# Patient Record
Sex: FEMALE | Race: WHITE | NOT HISPANIC OR LATINO | Employment: FULL TIME | URBAN - METROPOLITAN AREA
[De-identification: names, ages, dates, MRNs, and addresses within clinical notes are randomized per-mention and may not be internally consistent; named-entity substitution may affect disease eponyms.]

---

## 2017-04-13 ENCOUNTER — HOSPITAL ENCOUNTER (EMERGENCY)
Facility: MEDICAL CENTER | Age: 32
End: 2017-04-13
Attending: EMERGENCY MEDICINE
Payer: MEDICAID

## 2017-04-13 VITALS
HEIGHT: 66 IN | OXYGEN SATURATION: 97 % | HEART RATE: 74 BPM | RESPIRATION RATE: 20 BRPM | BODY MASS INDEX: 47.09 KG/M2 | DIASTOLIC BLOOD PRESSURE: 69 MMHG | TEMPERATURE: 97.8 F | WEIGHT: 293 LBS | SYSTOLIC BLOOD PRESSURE: 112 MMHG

## 2017-04-13 DIAGNOSIS — R11.2 NON-INTRACTABLE VOMITING WITH NAUSEA, UNSPECIFIED VOMITING TYPE: ICD-10-CM

## 2017-04-13 DIAGNOSIS — N83.209 HEMORRHAGIC OVARIAN CYST: ICD-10-CM

## 2017-04-13 PROCEDURE — 36415 COLL VENOUS BLD VENIPUNCTURE: CPT

## 2017-04-13 PROCEDURE — 96374 THER/PROPH/DIAG INJ IV PUSH: CPT

## 2017-04-13 PROCEDURE — 700111 HCHG RX REV CODE 636 W/ 250 OVERRIDE (IP): Performed by: EMERGENCY MEDICINE

## 2017-04-13 PROCEDURE — 99284 EMERGENCY DEPT VISIT MOD MDM: CPT

## 2017-04-13 RX ORDER — METOCLOPRAMIDE HYDROCHLORIDE 5 MG/ML
5 INJECTION INTRAMUSCULAR; INTRAVENOUS ONCE
Status: COMPLETED | OUTPATIENT
Start: 2017-04-13 | End: 2017-04-13

## 2017-04-13 RX ORDER — METOCLOPRAMIDE 10 MG/1
10 TABLET ORAL 4 TIMES DAILY PRN
Qty: 30 TAB | Refills: 0 | Status: SHIPPED | OUTPATIENT
Start: 2017-04-13 | End: 2021-09-08

## 2017-04-13 RX ORDER — OXYCODONE HYDROCHLORIDE AND ACETAMINOPHEN 5; 325 MG/1; MG/1
1-2 TABLET ORAL EVERY 4 HOURS PRN
Qty: 20 TAB | Refills: 0 | Status: SHIPPED | OUTPATIENT
Start: 2017-04-13 | End: 2021-09-08

## 2017-04-13 RX ADMIN — METOCLOPRAMIDE 5 MG: 5 INJECTION, SOLUTION INTRAMUSCULAR; INTRAVENOUS at 18:23

## 2017-04-13 ASSESSMENT — PAIN SCALES - GENERAL: PAINLEVEL_OUTOF10: 8

## 2017-04-13 NOTE — ED AVS SNAPSHOT
Home Care Instructions                                                                                                                Jackie Jose   MRN: [id number]    Department:  [facility], Emergency Dept   Date of Visit:  4/13/2017            [facility], Emergency Dept    [address] [address]    Phone:  [phone]      You were seen by     Ilsa Morillo M.D.      Your Diagnosis Was     Hemorrhagic ovarian cyst     N83.209       These are the medications you received during your hospitalization from 04/13/2017 1649 to 04/13/2017 1937     Date/Time Order Dose Route Action    04/13/2017 1823 metoclopramide (REGLAN) injection 5 mg 5 mg Intravenous Given      Follow-up Information     1. Follow up with Donny Murillo M.D.. Call in 1 day.    Specialty:  OB/Gyn    Why:  for close follow-up in the next week    Contact information    [address]  [facility] [address]  [phone]        Medication Information     Review all of your home medications and newly ordered medications with your primary doctor and/or pharmacist as soon as possible. Follow medication instructions as directed by your doctor and/or pharmacist.     Please keep your complete medication list with you and share with your physician. Update the information when medications are discontinued, doses are changed, or new medications (including over-the-counter products) are added; and carry medication information at all times in the event of emergency situations.               Medication List      ASK your doctor about these medications        Instructions    Morning Afternoon Evening Bedtime    dicyclomine 20 MG Tabs   Commonly known as:  BENTYL        Take 1 Tab by mouth 4 times a day as needed (abdominal pain).   Dose:  20 mg                        loperamide 2 MG Caps   Commonly known as:  IMODIUM        Take 1 Cap by mouth 4 times a day as needed for Diarrhea.   Dose:  2 mg                           * metoclopramide 10 MG Tabs   What changed:  Another medication with the same name was added. Make sure you understand how and when to take each.   Commonly known as:  REGLAN   Ask about: Which instructions should I use?        Take 1 Tab by mouth 4 times a day as needed (prn nausea).   Dose:  10 mg                        * metoclopramide 10 MG Tabs   What changed:  You were already taking a medication with the same name, and this prescription was added. Make sure you understand how and when to take each.   Commonly known as:  REGLAN   Ask about: Which instructions should I use?        Take 1 Tab by mouth 4 times a day as needed (nausea).   Dose:  10 mg                        ondansetron 4 MG Tbdp   Commonly known as:  ZOFRAN ODT        Take 1 Tab by mouth every 8 hours as needed for Nausea/Vomiting.   Dose:  4 mg                        * oxycodone-acetaminophen 5-325 MG Tabs   What changed:  Another medication with the same name was added. Make sure you understand how and when to take each.   Commonly known as:  PERCOCET   Ask about: Which instructions should I use?        Take 1-2 Tabs by mouth every 6 hours as needed (pain).   Dose:  1-2 Tab                        * oxycodone-acetaminophen 5-325 MG Tabs   What changed:  Another medication with the same name was added. Make sure you understand how and when to take each.   Commonly known as:  PERCOCET   Ask about: Which instructions should I use?        Take 1-2 Tabs by mouth every four hours as needed (pain).   Dose:  1-2 Tab                        * oxycodone-acetaminophen 5-325 MG Tabs   What changed:  You were already taking a medication with the same name, and this prescription was added. Make sure you understand how and when to take each.   Commonly known as:  PERCOCET   Ask about: Which instructions should I use?        Take 1-2 Tabs by mouth every four hours as needed.   Dose:  1-2 Tab                        * Notice:  This list has 5  medication(s) that are the same as other medications prescribed for you. Read the directions carefully, and ask your doctor or other care provider to review them with you.         Where to Get Your Medications      These medications were sent to [facility] - [place], [place] - [address] [place] [address]     Phone:  [phone]    - metoclopramide 10 MG Tabs      You can get these medications from any pharmacy     Bring a paper prescription for each of these medications    - oxycodone-acetaminophen 5-325 MG Tabs              Discharge Instructions       Ovarian Cyst  An ovarian cyst is a sac filled with fluid or blood. This sac is attached to the ovary. Some cysts go away on their own. Other cysts need treatment.   HOME CARE   · Only take medicine as told by your doctor.  · Follow up with your doctor as told.  · Get regular pelvic exams and Pap tests.  GET HELP IF:  · Your periods are late, not regular, or painful.  · You stop having periods.  · Your belly (abdominal) or pelvic pain does not go away.  · Your belly becomes large or puffy (swollen).  · You have a hard time peeing (totally emptying your bladder).  · You have pressure on your bladder.  · You have pain during sex.  · You feel fullness, pressure, or discomfort in your belly.  · You lose weight for no reason.  · You feel sick most of the time.  · You have a hard time pooping (constipation).  · You do not feel like eating.  · You develop pimples (acne).  · You have an increase in hair on your body and face.  · You are gaining weight for no reason.  · You think you are pregnant.  GET HELP RIGHT AWAY IF:   · Your belly pain gets worse.  · You feel sick to your stomach (nauseous), and you throw up (vomit).  · You have a fever that comes on fast.  · You have belly pain while pooping (bowel movement).  · Your periods are heavier than usual.  MAKE SURE YOU:   · Understand these instructions.  · Will watch your condition.  · Will get help  right away if you are not doing well or get worse.     This information is not intended to replace advice given to you by your health care provider. Make sure you discuss any questions you have with your health care provider.     Document Released: 06/05/2009 Document Revised: 10/08/2014 Document Reviewed: 08/25/2014  Elsevier Interactive Patient Education ©2016 [employer]            Patient Information     Patient Information    Following emergency treatment: all patient requiring follow-up care must return either to a private physician or a clinic if your condition worsens before you are able to obtain further medical attention, please return to the emergency room.     Billing Information    At [facility], we work to make the billing process streamlined for our patients.  Our Representatives are here to answer any questions you may have regarding your hospital bill.  If you have insurance coverage and have supplied your insurance information to us, we will submit a claim to your insurer on your behalf.  Should you have any questions regarding your bill, we can be reached online or by phone as follows:  Online: You are able pay your bills online or live chat with our representatives about any billing questions you may have. We are here to help Monday - Friday from 8:00am to 7:30pm and 9:00am - 12:00pm on Saturdays.  Please visit [email or web address]  for more information.   Phone:  [phone] or [phone]    Please note that your emergency physician, surgeon, pathologist, radiologist, anesthesiologist, and other specialists are not employed by [facility] and will therefore bill separately for their services.  Please contact them directly for any questions concerning their bills at the numbers below:     Emergency Physician Services:  [phone]  [facility]:  [phone]  [facility]:  [phone]  [facility]:   [phone]    1. Your final bill may vary from the amount quoted upon discharge if all procedures are not complete at that time, or if your doctor has additional procedures of which we are not aware. You will receive an additional bill if you return to the Emergency Department at [facility] for suture removal regardless of the facility of which the sutures were placed.     2. Please arrange for settlement of this account at the emergency registration.    3. All self-pay accounts are due in full at the time of treatment.  If you are unable to meet this obligation then payment is expected within 4-5 days.     4. If you have had radiology studies (CT, X-ray, Ultrasound, MRI), you have received a preliminary result during your emergency department visit. Please contact the radiology department [phone] to receive a copy of your final result. Please discuss the Final result with your primary physician or with the follow up physician provided.     Crisis Hotline:  [place] Crisis Hotline:  4-875-DYXYPCH or 1-756.545.6128  [place] Crisis Hotline:    1-671.209.5914 or 716-592-6392         ED Discharge Follow Up Questions    1. In order to provide you with very good care, we would like to follow up with a phone call in the next few days.  May we have your permission to contact you?     YES /  NO    2. What is the best phone number to call you? (       )_____-__________    3. What is the best time to call you?      Morning  /  Afternoon  /  Evening                   Patient Signature:  ____________________________________________________________    Date:  ____________________________________________________________

## 2017-04-13 NOTE — ED NOTES
Chief Complaint   Patient presents with   • RLQ Pain     bib amb. seen at [facility] for large cyst on R. ovary. meds not helping pain.   • Nausea       Given fentanyl, 500mL of NS, and zofran by EMS

## 2017-04-13 NOTE — ED AVS SNAPSHOT
Musicnotes Access Code: [email or web address]  Expires: 5/13/2017  7:37 PM    Musicnotes  A secure, online tool to manage your health information     [employer]’s Musicnotes® is a secure, online tool that connects you to your personalized health information from the privacy of your home -- day or night - making it very easy for you to manage your healthcare. Once the activation process is completed, you can even access your medical information using the Musicnotes debbie, which is available for free in the Apple Debbie store or Google Play store.     Musicnotes provides the following levels of access (as shown below):   My Chart Features   [facility] Primary Care Doctor [facility]  Specialists [facility]  Urgent  Care Non-[facility]  Primary Care  Doctor   Email your healthcare team securely and privately 24/7 X X X X   Manage appointments: schedule your next appointment; view details of past/upcoming appointments X      Request prescription refills. X      View recent personal medical records, including lab and immunizations X X X X   View health record, including health history, allergies, medications X X X X   Read reports about your outpatient visits, procedures, consult and ER notes X X X X   See your discharge summary, which is a recap of your hospital and/or ER visit that includes your diagnosis, lab results, and care plan. X X       How to register for Musicnotes:  1. Go to  [email or web address].  2. Click on the Sign Up Now box, which takes you to the New Member Sign Up page. You will need to provide the following information:  a. Enter your Musicnotes Access Code exactly as it appears at the top of this page. (You will not need to use this code after you’ve completed the sign-up process. If you do not sign up before the expiration date, you must request a new code.)   b. Enter your date of birth.   c. Enter your home email address.   d. Click Submit, and follow the next screen’s instructions.  3. Create a Musicnotes ID. This will be your Musicnotes  login ID and cannot be changed, so think of one that is secure and easy to remember.  4. Create a Satmex password. You can change your password at any time.  5. Enter your Password Reset Question and Answer. This can be used at a later time if you forget your password.   6. Enter your e-mail address. This allows you to receive e-mail notifications when new information is available in Satmex.  7. Click Sign Up. You can now view your health information.    For assistance activating your Satmex account, call [phone]

## 2017-04-13 NOTE — ED AVS SNAPSHOT
4/13/2017    Jackie Jose  [address]  [place] [address]    Dear Jackie:    [facility] wants to ensure your discharge home is safe and you or your loved ones have had all of your questions answered regarding your care after you leave the hospital.    Below is a list of resources and contact information should you have any questions regarding your hospital stay, follow-up instructions, or active medical symptoms.    Questions or Concerns Regarding… Contact   Medical Questions Related to Your Discharge  (7 days a week, 8am-5pm) Contact a Nurse Care Coordinator   [phone]   Medical Questions Not Related to Your Discharge  (24 hours a day / 7 days a week)  Contact the Nurse Health Line   [phone]    Medications or Discharge Instructions Refer to your discharge packet   or contact your [facility] Primary Care Provider   [phone]   Follow-up Appointment(s) Schedule your appointment via [employer]   or contact Scheduling [phone]   Billing Review your statement via [employer]  or contact Billing [phone]   Medical Records Review your records via [employer]   or contact Medical Records [phone]     You may receive a telephone call within two days of discharge. This call is to make certain you understand your discharge instructions and have the opportunity to have any questions answered. You can also easily access your medical information, test results and upcoming appointments via the [employer] free online health management tool. You can learn more and sign up at [email or web address]/[employer]. For assistance setting up your [employer] account, please call [phone].    Once again, we want to ensure your discharge home is safe and that you have a clear understanding of any next steps in your care. If you have any questions or concerns, please do not hesitate to contact us, we are here for you. Thank you for choosing [facility] for your healthcare needs.    Sincerely,    Your [facility] Healthcare Team

## 2017-04-14 NOTE — ED PROVIDER NOTES
"ED Provider Note    Scribed for Dr. Ilsa Morillo M.D. by Padmini Light. 4/13/2017, 5:03 PM.    Primary care provider: [facility]  Means of arrival: Ambulance  History obtained from: Patient  History limited by: None    CHIEF COMPLAINT  Chief Complaint   Patient presents with   • RLQ Pain     bib amb. seen at [facility] for large cyst on R. ovary. meds not helping pain.   • Nausea       HPI  Jackie Jose is a 32 y.o. female who presents to the Emergency Department brought in by ambulance due to intermittent right lower quadrant pain onset Sunday and worsened today. The patient was seen at [facility] on Tuesday and was told that she had a large cyst on her right ovary. She was discharged with a prescription for Ibuprofen and Ultram and instructed to return if her symptoms worsen. The patient reports that her symptoms worsened at 2 or 3 AM today. While she was at work today, she had an intermittent \"stabbing and shooting\" pain and came to [facility] since \"it is closer than [facility]\". She has had associated symptoms of intermittent nausea and vomiting since Sunday and states that she has been unable to retain food. She has been retaining some fluids. The patient also reports chills, intermittent dizziness, frequent urination, and light vaginal bleeding for the past couple of days. The vaginal bleeding will last for a couple of hours before resolving. The patient does not report any aggravating or alleviating factors for her symptoms. Denies vaginal discharge, diarrhea, or fever. Her LMP was 3/8/17 and is normally on time. She has had an appendectomy, cholecystectomy, and 3 C-sections. Per patient, she has a referral for Dr. Murillo (Ob/GYN) but has not been able to see him as yet due to insurance reasons.     REVIEW OF SYSTEMS  Pertinent positives include right lower quadrant pain, nausea, vomiting, chills, dizziness, frequent urination, and vaginal bleeding. Pertinent negatives include no " vaginal discharge, diarrhea, or fever. As above, all other systems reviewed and are negative.   See HPI for further details.   C    PAST MEDICAL HISTORY  Past Medical History   Diagnosis Date   • H/O:   &    • Previous  delivery, antepartum condition or complication 2012   • Appendicitis      Appy    • Vomiting 9/15/2013       SURGICAL HISTORY  Past Surgical History   Procedure Laterality Date   • Other orthopedic surgery       meniscus repair   • Suzanne by laparoscopy  2013     Performed by Dm Whalen M.D. at SURGERY [facility] ORS   • Appendectomy  2012   • Gyn surgery          • Pelviscopy  2012     Performed by Woodrow Lopez M.D. at SURGERY [facility] ORS   • Primary c section     • Repeat c section w tubal ligation  2013     Performed by Tobi Harris M.D. at LABOR AND DELIVERY       SOCIAL HISTORY  Social History   Substance Use Topics   • Smoking status: Former Smoker -- 0.33 packs/day for 6 years     Types: Cigarettes     Quit date: 2014   • Smokeless tobacco: Never Used      Comment: Last smoked 10/09/12   • Alcohol Use: Yes      Comment: socially      History   Drug Use No     Comment: denies       FAMILY HISTORY  Family History   Problem Relation Age of Onset   • Hypertension Father    • Bipolar disorder Sister      Middle sibling   • Thyroid Maternal Grandmother    • Alcohol/Drug Maternal Grandfather        CURRENT MEDICATIONS  No current facility-administered medications on file prior to encounter.     Current Outpatient Prescriptions on File Prior to Encounter   Medication Sig Dispense Refill   • metoclopramide (REGLAN) 10 MG Tab Take 1 Tab by mouth 4 times a day as needed (prn nausea). 20 Tab 0   • dicyclomine (BENTYL) 20 MG Tab Take 1 Tab by mouth 4 times a day as needed (abdominal pain). 20 Tab 0   • loperamide (IMODIUM) 2 MG Cap Take 1 Cap by mouth 4 times a day as needed for Diarrhea. 30 Cap 3   • ondansetron  "(ZOFRAN ODT) 4 MG TABLET DISPERSIBLE Take 1 Tab by mouth every 8 hours as needed for Nausea/Vomiting. 5 Tab 0   • oxycodone-acetaminophen (PERCOCET) 5-325 MG Tab Take 1-2 Tabs by mouth every four hours as needed (pain). 20 Tab 0   • oxycodone-acetaminophen (PERCOCET) 5-325 MG TABS Take 1-2 Tabs by mouth every 6 hours as needed (pain). 12 Tab 0       ALLERGIES  Allergies   Allergen Reactions   • Other Drug Anaphylaxis     IV contrast for CT, pt had respiratory distress and nausea    • Morphine      Chest pain       PHYSICAL EXAM  VITAL SIGNS: /69 mmHg  Pulse 63  Temp(Src) 36.6 °C (97.8 °F)  Resp 20  Ht 1.676 m (5' 6\")  Wt 136.079 kg (300 lb)  BMI 48.44 kg/m2  LMP 07/14/2012  Vitals reviewed.    Consitutional: Well-developed, obese. Mild distress.   HENT: Normocephalic, right external ear normal, left external ear normal, oropharynx clear and moist.  Eyes: Conjunctivae normal, extraocular movements normal. Negative for: discharge in right and left eye, icterus.  Neck: Range of motion normal, supple. Negative for cervical adenopathy.  Cardiovascular: Normal rate, regular rhythm, heart sounds normal, intact distal pulses. Negative for: murmur, rub, gallop.  Pulmonary/Chest Wall: Effort normal, breath sounds normal. Negative for: respiratory distress, wheezes, rales, rhonchi.   Abdominal: Soft, bowel sounds normal. Tenderness to right lower quadrant with involuntary guarding, palpation of right upper quadrant causes lower quadrant pain. Negative for: distention.   Musculoskeletal: Normal range of motion. Negative for edema.  Neurological: Alert and oriented x3. No focal deficits.  Skin: Warm, dry. Negative for rash.  Psych: Mood/affect normal, behavior normal, judgment normal.    COURSE & MEDICAL DECISION MAKING  Nursing notes, VS, PMSFHx reviewed in chart.      5:03 PM Patient seen and examined at bedside. I explained that we will get her information from [facility]. Patient understands and agrees. " "    5:56 PM- Patient will be treated with reglan 5 mg IV for her symptoms.     7:15 PM- Reviewed labs and radiological studies from [facility]. According to their ultrasound, patient had a hemorrhagic 3 cm right ovarian cyst with normal labs including urine results.    7:17 PM - Re-examined; The patient is fast asleep. She will be discharged with a prescription for reglan and percocet. The patient was given a referral to Dr. Murillo (Ob/GYN) and should to return to the ED if her symptoms worsen. Her vitals prior to discharge are: /69 mmHg  Pulse 74  Temp(Src) 36.6 °C (97.8 °F)  Resp 20  Ht 1.676 m (5' 6\")  Wt 136.079 kg (300 lb)  BMI 48.44 kg/m2  SpO2 97%  LMP 07/14/2012    I reviewed prescription monitoring program for patient's narcotic use before prescribing a scheduled drug.The patient will not drink alcohol nor drive with prescribed medications. The patient will return for new or worsening symptoms and is stable at the time of discharge.    The patient is referred to a primary physician for blood pressure management, diabetic screening, and for all other preventive health concerns.      DISPOSITION:  Patient will be discharged home in stable condition.    FOLLOW UP:  Donny Murillo M.D.  [address] [place], [place] [address]  [facility] [address]  [phone]    Call in 1 day  for close follow-up in the next week      OUTPATIENT MEDICATIONS:  Discharge Medication List as of 4/13/2017  7:37 PM      START taking these medications    Details   !! metoclopramide (REGLAN) 10 MG Tab Take 1 Tab by mouth 4 times a day as needed (nausea)., Disp-30 Tab, R-0, Normal      !! oxycodone-acetaminophen (PERCOCET) 5-325 MG Tab Take 1-2 Tabs by mouth every four hours as needed., Disp-20 Tab, R-0, Print Rx Paper       !! - Potential duplicate medications found. Please discuss with provider.            FINAL IMPRESSION  1. Hemorrhagic ovarian cyst    2. Non-intractable vomiting with nausea, unspecified vomiting type          I, Padmini VEGA. " Kuldeep (Scribe), am scribing for, and in the presence of, Ilsa Morillo M.D..    Electronically signed by: Padmini Light (Thao), 4/13/2017    IlIsa M.D. personally performed the services described in this documentation, as scribed by Padmini Light in my presence, and it is both accurate and complete.      The note accurately reflects work and decisions made by me.  Ilsa Morillo  4/13/2017  8:40 PM

## 2017-04-14 NOTE — DISCHARGE INSTRUCTIONS
Ovarian Cyst  An ovarian cyst is a sac filled with fluid or blood. This sac is attached to the ovary. Some cysts go away on their own. Other cysts need treatment.   HOME CARE   · Only take medicine as told by your doctor.  · Follow up with your doctor as told.  · Get regular pelvic exams and Pap tests.  GET HELP IF:  · Your periods are late, not regular, or painful.  · You stop having periods.  · Your belly (abdominal) or pelvic pain does not go away.  · Your belly becomes large or puffy (swollen).  · You have a hard time peeing (totally emptying your bladder).  · You have pressure on your bladder.  · You have pain during sex.  · You feel fullness, pressure, or discomfort in your belly.  · You lose weight for no reason.  · You feel sick most of the time.  · You have a hard time pooping (constipation).  · You do not feel like eating.  · You develop pimples (acne).  · You have an increase in hair on your body and face.  · You are gaining weight for no reason.  · You think you are pregnant.  GET HELP RIGHT AWAY IF:   · Your belly pain gets worse.  · You feel sick to your stomach (nauseous), and you throw up (vomit).  · You have a fever that comes on fast.  · You have belly pain while pooping (bowel movement).  · Your periods are heavier than usual.  MAKE SURE YOU:   · Understand these instructions.  · Will watch your condition.  · Will get help right away if you are not doing well or get worse.     This information is not intended to replace advice given to you by your health care provider. Make sure you discuss any questions you have with your health care provider.     Document Released: 06/05/2009 Document Revised: 10/08/2014 Document Reviewed: 08/25/2014  [employer] Patient Education ©2016 [employer]

## 2017-04-26 ENCOUNTER — HOSPITAL ENCOUNTER (OUTPATIENT)
Dept: LAB | Facility: MEDICAL CENTER | Age: 32
End: 2017-04-26
Attending: SPECIALIST
Payer: MEDICAID

## 2017-04-26 PROCEDURE — 87491 CHLMYD TRACH DNA AMP PROBE: CPT

## 2017-04-26 PROCEDURE — 88305 TISSUE EXAM BY PATHOLOGIST: CPT

## 2017-04-26 PROCEDURE — 88175 CYTOPATH C/V AUTO FLUID REDO: CPT

## 2017-04-26 PROCEDURE — 87591 N.GONORRHOEAE DNA AMP PROB: CPT

## 2017-04-26 PROCEDURE — 87624 HPV HI-RISK TYP POOLED RSLT: CPT

## 2017-04-28 LAB — PATHOLOGY CONSULT NOTE: NORMAL

## 2017-04-30 LAB
C TRACH DNA GENITAL QL NAA+PROBE: NEGATIVE
CYTOLOGY REG CYTOL: NORMAL
HPV HR 12 DNA CVX QL NAA+PROBE: NEGATIVE
HPV16 DNA SPEC QL NAA+PROBE: NEGATIVE
HPV18 DNA SPEC QL NAA+PROBE: NEGATIVE
N GONORRHOEA DNA GENITAL QL NAA+PROBE: NEGATIVE
SPECIMEN SOURCE: NORMAL
SPECIMEN SOURCE: NORMAL

## 2020-02-08 ENCOUNTER — HOSPITAL ENCOUNTER (EMERGENCY)
Dept: HOSPITAL 8 - ED | Age: 35
Discharge: HOME | End: 2020-02-08
Payer: SELF-PAY

## 2020-02-08 VITALS — DIASTOLIC BLOOD PRESSURE: 74 MMHG | SYSTOLIC BLOOD PRESSURE: 103 MMHG

## 2020-02-08 VITALS — HEIGHT: 69 IN | BODY MASS INDEX: 27.76 KG/M2 | WEIGHT: 187.39 LBS

## 2020-02-08 DIAGNOSIS — Z98.51: ICD-10-CM

## 2020-02-08 DIAGNOSIS — Z90.49: ICD-10-CM

## 2020-02-08 DIAGNOSIS — F17.200: ICD-10-CM

## 2020-02-08 DIAGNOSIS — R19.7: ICD-10-CM

## 2020-02-08 DIAGNOSIS — R10.84: Primary | ICD-10-CM

## 2020-02-08 LAB
ALBUMIN SERPL-MCNC: 3.8 G/DL (ref 3.4–5)
ALP SERPL-CCNC: 79 U/L (ref 45–117)
ALT SERPL-CCNC: 10 U/L (ref 12–78)
ANION GAP SERPL CALC-SCNC: 10 MMOL/L (ref 5–15)
BASOPHILS # BLD AUTO: 0.01 X10^3/UL (ref 0–0.1)
BASOPHILS NFR BLD AUTO: 0 % (ref 0–1)
BILIRUB SERPL-MCNC: 0.4 MG/DL (ref 0.2–1)
CALCIUM SERPL-MCNC: 8.6 MG/DL (ref 8.5–10.1)
CHLORIDE SERPL-SCNC: 105 MMOL/L (ref 98–107)
CREAT SERPL-MCNC: 0.9 MG/DL (ref 0.55–1.02)
EOSINOPHIL # BLD AUTO: 0.01 X10^3/UL (ref 0–0.4)
EOSINOPHIL NFR BLD AUTO: 0 % (ref 1–7)
ERYTHROCYTE [DISTWIDTH] IN BLOOD BY AUTOMATED COUNT: 18.3 % (ref 9.6–15.2)
LYMPHOCYTES # BLD AUTO: 0.88 X10^3/UL (ref 1–3.4)
LYMPHOCYTES NFR BLD AUTO: 15 % (ref 22–44)
MCH RBC QN AUTO: 26.5 PG (ref 27–34.8)
MCHC RBC AUTO-ENTMCNC: 31.7 G/DL (ref 32.4–35.8)
MCV RBC AUTO: 83.5 FL (ref 80–100)
MD: NO
MONOCYTES # BLD AUTO: 0.41 X10^3/UL (ref 0.2–0.8)
MONOCYTES NFR BLD AUTO: 7 % (ref 2–9)
NEUTROPHILS # BLD AUTO: 4.49 X10^3/UL (ref 1.8–6.8)
NEUTROPHILS NFR BLD AUTO: 77 % (ref 42–75)
PLATELET # BLD AUTO: 181 X10^3/UL (ref 130–400)
PMV BLD AUTO: 8.3 FL (ref 7.4–10.4)
PROT SERPL-MCNC: 8.1 G/DL (ref 6.4–8.2)
RBC # BLD AUTO: 4.92 X10^6/UL (ref 3.82–5.3)

## 2020-02-08 PROCEDURE — 84703 CHORIONIC GONADOTROPIN ASSAY: CPT

## 2020-02-08 PROCEDURE — 83690 ASSAY OF LIPASE: CPT

## 2020-02-08 PROCEDURE — 80053 COMPREHEN METABOLIC PANEL: CPT

## 2020-02-08 PROCEDURE — 85025 COMPLETE CBC W/AUTO DIFF WBC: CPT

## 2020-02-08 PROCEDURE — 99283 EMERGENCY DEPT VISIT LOW MDM: CPT

## 2020-02-08 PROCEDURE — 96372 THER/PROPH/DIAG INJ SC/IM: CPT

## 2020-02-08 PROCEDURE — 36415 COLL VENOUS BLD VENIPUNCTURE: CPT

## 2020-02-08 PROCEDURE — 96374 THER/PROPH/DIAG INJ IV PUSH: CPT

## 2020-02-08 PROCEDURE — 96361 HYDRATE IV INFUSION ADD-ON: CPT

## 2020-02-08 NOTE — NUR
THIS IS A 33 YO F BIB [facility] W/ C/O EPIGASTRIC/PERIUMBILICAL ABD PAIN, NAUSEA, 
CHILLS, BODYACHES AND DIARRHEA X5 DAYS. NADN. PT IS TACHYCARDIC OTHER VS WITHIN 
NORMAL LIMITS. RESTING ON GURNEY W/ CALL LIGHT IN REACH.

## 2020-02-08 NOTE — NUR
PIV STARTED. PT MEDICATED PER EMAR. LABS COLLECTED. VS STABLE. CALL LIGHT IN 
REACH. AWAITING TEST RESULTS AT THIS TIME.

## 2020-02-08 NOTE — NUR
PT RESTING ON [facility] W/ CALL LIGHT IN REACH. DC INSTRUCTIONS UP. PER  
WILL WAIT FOR 1L NS TO FINISH INFUSING BEFORE DC.

## 2020-08-04 ENCOUNTER — HOSPITAL ENCOUNTER (EMERGENCY)
Dept: HOSPITAL 8 - ED | Age: 35
Discharge: LEFT BEFORE DISCHARGE | End: 2020-08-04
Payer: SELF-PAY

## 2020-08-04 DIAGNOSIS — Z53.21: ICD-10-CM

## 2020-08-04 DIAGNOSIS — K08.89: Primary | ICD-10-CM

## 2020-12-30 ENCOUNTER — HOSPITAL ENCOUNTER (EMERGENCY)
Facility: MEDICAL CENTER | Age: 35
End: 2020-12-30
Payer: MEDICAID

## 2020-12-30 VITALS
HEART RATE: 98 BPM | TEMPERATURE: 98.6 F | BODY MASS INDEX: 44.3 KG/M2 | OXYGEN SATURATION: 98 % | DIASTOLIC BLOOD PRESSURE: 69 MMHG | RESPIRATION RATE: 20 BRPM | HEIGHT: 69 IN | SYSTOLIC BLOOD PRESSURE: 113 MMHG

## 2020-12-30 PROCEDURE — 302449 STATCHG TRIAGE ONLY (STATISTIC)

## 2021-09-08 ENCOUNTER — HOSPITAL ENCOUNTER (EMERGENCY)
Facility: MEDICAL CENTER | Age: 36
End: 2021-09-08
Payer: MEDICAID

## 2021-09-08 VITALS
WEIGHT: 206.79 LBS | BODY MASS INDEX: 30.63 KG/M2 | HEIGHT: 69 IN | DIASTOLIC BLOOD PRESSURE: 65 MMHG | SYSTOLIC BLOOD PRESSURE: 104 MMHG | OXYGEN SATURATION: 99 % | RESPIRATION RATE: 16 BRPM | TEMPERATURE: 97.8 F | HEART RATE: 90 BPM

## 2021-09-08 LAB
APPEARANCE UR: ABNORMAL
BACTERIA #/AREA URNS HPF: ABNORMAL /HPF
BILIRUB UR QL STRIP.AUTO: NEGATIVE
COLOR UR: YELLOW
EPI CELLS #/AREA URNS HPF: ABNORMAL /HPF
GLUCOSE UR STRIP.AUTO-MCNC: NEGATIVE MG/DL
HYALINE CASTS #/AREA URNS LPF: ABNORMAL /LPF
KETONES UR STRIP.AUTO-MCNC: ABNORMAL MG/DL
LEUKOCYTE ESTERASE UR QL STRIP.AUTO: ABNORMAL
MICRO URNS: ABNORMAL
NITRITE UR QL STRIP.AUTO: POSITIVE
PH UR STRIP.AUTO: 5.5 [PH] (ref 5–8)
PROT UR QL STRIP: NEGATIVE MG/DL
RBC # URNS HPF: ABNORMAL /HPF
RBC UR QL AUTO: ABNORMAL
SP GR UR STRIP.AUTO: 1.03
UROBILINOGEN UR STRIP.AUTO-MCNC: 1 MG/DL
WBC #/AREA URNS HPF: ABNORMAL /HPF

## 2021-09-08 PROCEDURE — 302449 STATCHG TRIAGE ONLY (STATISTIC)

## 2021-09-08 PROCEDURE — 81001 URINALYSIS AUTO W/SCOPE: CPT

## 2021-09-08 NOTE — ED TRIAGE NOTES
Vitals:    09/08/21 1327   BP: 104/65   Pulse: 90   Resp: 16   Temp: 36.6 °C (97.8 °F)   SpO2: 99%     Chief Complaint   Patient presents with   • Diarrhea     for a week   • Fatigue   • Abdominal Pain     Pt has been having the above complaints for about a week. She states she hasn't tried any over the counter medications yet.     Abdominal pain protocol ordered.     Pt placed in lobby and educated on waiting room process. Apologized for wait time.

## 2023-09-19 ENCOUNTER — TELEPHONE (OUTPATIENT)
Dept: OBGYN | Facility: CLINIC | Age: 38
End: 2023-09-19
Payer: MEDICAID

## 2023-09-19 ENCOUNTER — TELEPHONE (OUTPATIENT)
Dept: OBGYN | Facility: CLINIC | Age: 38
End: 2023-09-19

## 2023-10-27 ENCOUNTER — HOSPITAL ENCOUNTER (EMERGENCY)
Facility: MEDICAL CENTER | Age: 38
End: 2023-10-27
Attending: EMERGENCY MEDICINE
Payer: MEDICAID

## 2023-10-27 VITALS
TEMPERATURE: 98.3 F | BODY MASS INDEX: 37.03 KG/M2 | DIASTOLIC BLOOD PRESSURE: 68 MMHG | SYSTOLIC BLOOD PRESSURE: 116 MMHG | HEIGHT: 69 IN | RESPIRATION RATE: 16 BRPM | OXYGEN SATURATION: 99 % | HEART RATE: 62 BPM | WEIGHT: 250 LBS

## 2023-10-27 DIAGNOSIS — L03.115 CELLULITIS OF RIGHT LOWER EXTREMITY: ICD-10-CM

## 2023-10-27 PROCEDURE — 700111 HCHG RX REV CODE 636 W/ 250 OVERRIDE (IP): Mod: UD | Performed by: EMERGENCY MEDICINE

## 2023-10-27 PROCEDURE — A9270 NON-COVERED ITEM OR SERVICE: HCPCS | Mod: UD | Performed by: EMERGENCY MEDICINE

## 2023-10-27 PROCEDURE — 700102 HCHG RX REV CODE 250 W/ 637 OVERRIDE(OP): Mod: UD | Performed by: EMERGENCY MEDICINE

## 2023-10-27 PROCEDURE — 99283 EMERGENCY DEPT VISIT LOW MDM: CPT

## 2023-10-27 RX ORDER — CEPHALEXIN 500 MG/1
500 CAPSULE ORAL 4 TIMES DAILY
Qty: 28 CAPSULE | Refills: 0 | Status: ACTIVE | OUTPATIENT
Start: 2023-10-27 | End: 2023-11-03

## 2023-10-27 RX ORDER — OXYCODONE HYDROCHLORIDE AND ACETAMINOPHEN 5; 325 MG/1; MG/1
1 TABLET ORAL ONCE
Status: COMPLETED | OUTPATIENT
Start: 2023-10-27 | End: 2023-10-27

## 2023-10-27 RX ADMIN — OXYCODONE AND ACETAMINOPHEN 1 TABLET: 5; 325 TABLET ORAL at 08:27

## 2023-10-27 ASSESSMENT — FIBROSIS 4 INDEX: FIB4 SCORE: 0.82

## 2023-10-27 NOTE — ED NOTES
BEDSIDE REPORT RECEIVED FROM GEORGE MÁRQUEZ     PT IS RESTING IN BED. BREATHING IS EVEN AND UNLABORED, SKIN IS WARM AND DRY, VSS, NAD, WILL CONTINUE TO MONITOR. PENDING MD EVALUATION.

## 2023-10-27 NOTE — DISCHARGE INSTRUCTIONS
See your doctor for recheck in 48 hours or return to the ER for worsening symptoms, fever, chills, increasing pain, or other concerns    Take Keflex as prescribed for skin infection    Apply warm compresses

## 2023-10-27 NOTE — ED NOTES
RN visualized wound. Pt  [place] around outer edge of swelling before pt left for work last night. + swelling/redness outside of marked area. Attempted PIV access, pt states he has hx of IV drug use. RN unsuccessful at IV placement at this time.
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
## 2023-10-27 NOTE — ED NOTES
Bedside report to GEORGE Mcdonough. Pt remains on room air, connected to vitals. Pending ERP to bedside. Care relinquished.

## 2023-10-27 NOTE — ED TRIAGE NOTES
"Chief Complaint   Patient presents with    Abscess     Pt reports right posterior thigh; possible abscess. Pt reports redness/warm to the touch around reported site. Pt informed RN,  squeezed the possible abscess,worsen since. Hx MRSA. Rate pain 6/10 aching/itching.        39 y/o female ambulatory to triage for above complaint. Aaox4.    Pt place in waiting area. Educated on triage process. Pt will inform staff of any medical changes.    /79   Pulse 81   Temp 37.1 °C (98.7 °F) (Temporal)   Resp 18   Ht 1.753 m (5' 9\")   Wt 113 kg (250 lb)   LMP 10/25/2023 (Exact Date)   SpO2 99%   BMI 36.92 kg/m²     "

## 2023-12-01 ENCOUNTER — HOSPITAL ENCOUNTER (EMERGENCY)
Facility: MEDICAL CENTER | Age: 38
End: 2023-12-01
Attending: EMERGENCY MEDICINE
Payer: MEDICAID

## 2023-12-01 VITALS
WEIGHT: 273.15 LBS | TEMPERATURE: 97.4 F | SYSTOLIC BLOOD PRESSURE: 137 MMHG | DIASTOLIC BLOOD PRESSURE: 85 MMHG | HEIGHT: 69 IN | BODY MASS INDEX: 40.46 KG/M2 | OXYGEN SATURATION: 97 % | RESPIRATION RATE: 18 BRPM | HEART RATE: 86 BPM

## 2023-12-01 DIAGNOSIS — R10.30 LOWER ABDOMINAL PAIN: ICD-10-CM

## 2023-12-01 LAB
ALBUMIN SERPL BCP-MCNC: 4 G/DL (ref 3.2–4.9)
ALBUMIN/GLOB SERPL: 1.3 G/DL
ALP SERPL-CCNC: 72 U/L (ref 30–99)
ALT SERPL-CCNC: 10 U/L (ref 2–50)
ANION GAP SERPL CALC-SCNC: 12 MMOL/L (ref 7–16)
APPEARANCE UR: CLEAR
AST SERPL-CCNC: 14 U/L (ref 12–45)
BASOPHILS # BLD AUTO: 0.5 % (ref 0–1.8)
BASOPHILS # BLD: 0.03 K/UL (ref 0–0.12)
BILIRUB SERPL-MCNC: 0.4 MG/DL (ref 0.1–1.5)
BILIRUB UR QL STRIP.AUTO: NEGATIVE
BUN SERPL-MCNC: 16 MG/DL (ref 8–22)
CALCIUM ALBUM COR SERPL-MCNC: 8.7 MG/DL (ref 8.5–10.5)
CALCIUM SERPL-MCNC: 8.7 MG/DL (ref 8.5–10.5)
CHLORIDE SERPL-SCNC: 104 MMOL/L (ref 96–112)
CO2 SERPL-SCNC: 20 MMOL/L (ref 20–33)
COLOR UR: YELLOW
CREAT SERPL-MCNC: 0.58 MG/DL (ref 0.5–1.4)
EOSINOPHIL # BLD AUTO: 0.17 K/UL (ref 0–0.51)
EOSINOPHIL NFR BLD: 2.9 % (ref 0–6.9)
ERYTHROCYTE [DISTWIDTH] IN BLOOD BY AUTOMATED COUNT: 38.8 FL (ref 35.9–50)
GFR SERPLBLD CREATININE-BSD FMLA CKD-EPI: 120 ML/MIN/1.73 M 2
GLOBULIN SER CALC-MCNC: 3 G/DL (ref 1.9–3.5)
GLUCOSE SERPL-MCNC: 88 MG/DL (ref 65–99)
GLUCOSE UR STRIP.AUTO-MCNC: NEGATIVE MG/DL
HCG SERPL QL: NEGATIVE
HCT VFR BLD AUTO: 37.9 % (ref 37–47)
HGB BLD-MCNC: 13 G/DL (ref 12–16)
IMM GRANULOCYTES # BLD AUTO: 0.01 K/UL (ref 0–0.11)
IMM GRANULOCYTES NFR BLD AUTO: 0.2 % (ref 0–0.9)
KETONES UR STRIP.AUTO-MCNC: ABNORMAL MG/DL
LEUKOCYTE ESTERASE UR QL STRIP.AUTO: NEGATIVE
LIPASE SERPL-CCNC: 21 U/L (ref 11–82)
LYMPHOCYTES # BLD AUTO: 1.32 K/UL (ref 1–4.8)
LYMPHOCYTES NFR BLD: 22.3 % (ref 22–41)
MCH RBC QN AUTO: 30 PG (ref 27–33)
MCHC RBC AUTO-ENTMCNC: 34.3 G/DL (ref 32.2–35.5)
MCV RBC AUTO: 87.5 FL (ref 81.4–97.8)
MICRO URNS: ABNORMAL
MONOCYTES # BLD AUTO: 0.35 K/UL (ref 0–0.85)
MONOCYTES NFR BLD AUTO: 5.9 % (ref 0–13.4)
NEUTROPHILS # BLD AUTO: 4.03 K/UL (ref 1.82–7.42)
NEUTROPHILS NFR BLD: 68.2 % (ref 44–72)
NITRITE UR QL STRIP.AUTO: NEGATIVE
NRBC # BLD AUTO: 0 K/UL
NRBC BLD-RTO: 0 /100 WBC (ref 0–0.2)
PH UR STRIP.AUTO: 5.5 [PH] (ref 5–8)
PLATELET # BLD AUTO: 194 K/UL (ref 164–446)
PMV BLD AUTO: 9.7 FL (ref 9–12.9)
POTASSIUM SERPL-SCNC: 3.9 MMOL/L (ref 3.6–5.5)
PROT SERPL-MCNC: 7 G/DL (ref 6–8.2)
PROT UR QL STRIP: NEGATIVE MG/DL
RBC # BLD AUTO: 4.33 M/UL (ref 4.2–5.4)
RBC UR QL AUTO: NEGATIVE
SODIUM SERPL-SCNC: 136 MMOL/L (ref 135–145)
SP GR UR STRIP.AUTO: 1.02
UROBILINOGEN UR STRIP.AUTO-MCNC: 0.2 MG/DL
WBC # BLD AUTO: 5.9 K/UL (ref 4.8–10.8)

## 2023-12-01 PROCEDURE — 99284 EMERGENCY DEPT VISIT MOD MDM: CPT

## 2023-12-01 PROCEDURE — 84703 CHORIONIC GONADOTROPIN ASSAY: CPT

## 2023-12-01 PROCEDURE — 80053 COMPREHEN METABOLIC PANEL: CPT

## 2023-12-01 PROCEDURE — 81003 URINALYSIS AUTO W/O SCOPE: CPT

## 2023-12-01 PROCEDURE — 302449 STATCHG TRIAGE ONLY (STATISTIC)

## 2023-12-01 PROCEDURE — 85025 COMPLETE CBC W/AUTO DIFF WBC: CPT

## 2023-12-01 PROCEDURE — 83690 ASSAY OF LIPASE: CPT

## 2023-12-01 PROCEDURE — 36415 COLL VENOUS BLD VENIPUNCTURE: CPT

## 2023-12-01 RX ORDER — ONDANSETRON 2 MG/ML
4 INJECTION INTRAMUSCULAR; INTRAVENOUS ONCE
Status: DISCONTINUED | OUTPATIENT
Start: 2023-12-01 | End: 2023-12-01

## 2023-12-01 RX ORDER — ONDANSETRON 4 MG/1
4 TABLET, ORALLY DISINTEGRATING ORAL ONCE
Status: DISCONTINUED | OUTPATIENT
Start: 2023-12-01 | End: 2023-12-01 | Stop reason: HOSPADM

## 2023-12-01 ASSESSMENT — PAIN DESCRIPTION - DESCRIPTORS: DESCRIPTORS: THROBBING

## 2023-12-01 ASSESSMENT — PAIN DESCRIPTION - PAIN TYPE
TYPE: ACUTE PAIN
TYPE: ACUTE PAIN

## 2023-12-01 ASSESSMENT — FIBROSIS 4 INDEX: FIB4 SCORE: 0.82

## 2023-12-01 NOTE — ED NOTES
Assist RN: Patient has chosen to leave the hospital against medical advice. The attending physician has not discharged the patient. Patient is not a risk to himself or others. I have discussed with the patient the following: Physician has not determined patient is ready for discharge. Risks and consequences of leaving the hospital too soon and the benefit of continued hospitalization.     Discharge against medical advice has been signed.    Attending physician has been notified.      Pt is A/O x 4, ambulatory to the lobby.

## 2023-12-01 NOTE — ED NOTES
Roque ambulated with stable gait to room, gowned, and placed on monitors (BP, Pulse Ox). Patient is low fall risk. Standard fall risk precautions in place including bed in lowest position, side rails up, call light within reach, and area free of clutter. Patient stable in RA. Chart up for ERP.

## 2023-12-01 NOTE — ED PROVIDER NOTES
"ED Provider Note    CHIEF COMPLAINT  Chief Complaint   Patient presents with    Abdominal Pain     Brought in by gt c/o lower abdominal pain off and on x months. + nausea and dizziness.        EXTERNAL RECORDS REVIEWED  External ED Note 23 at [facility]     HPI/ROS  LIMITATION TO HISTORY   Select: : None    OUTSIDE HISTORIAN(S):  None    Jackie Jose is a 38 y.o. female with endometriosis who presents for lower abdominal pain.  She reports pain in the bilateral lower quadrants intermittently for many months.  She has been told she has endometriosis and states she was seen at [facility] 3 months ago for similar pain and referred to a urogynecologist [facility].  She states \"I am on the list.\"  Patient states the pain usually improves with ibuprofen but it did not respond today so she came to the ER.  She reports a \"pressure\" sensation with associated nausea.  She denies fever, chills, vaginal discharge, urinary symptoms, vomiting, diarrhea.  Patient states she has tramadol at home and asks if she can take this.    LMP last week    Patient has had an appendectomy and tubal ligation.    Not on OCPs    PAST MEDICAL HISTORY   has a past medical history of Appendicitis (), H/O:  ( & ), Previous  delivery, antepartum condition or complication (2012), and Vomiting (9/15/2013).    SURGICAL HISTORY   has a past surgical history that includes other orthopedic surgery (); travon by laparoscopy (2013); appendectomy (2012); gyn surgery; pelviscopy (2012); primary c section; and repeat c section w tubal ligation (2013).    FAMILY HISTORY  Family History   Problem Relation Age of Onset    Hypertension Father     Bipolar disorder Sister         Middle sibling    Thyroid Maternal Grandmother     Alcohol/Drug Maternal Grandfather        SOCIAL HISTORY  Social History     Tobacco Use    Smoking status: Former     Current packs/day: 0.00     Average " "packs/day: 0.5 packs/day for 6.0 years (3.0 ttl pk-yrs)     Types: Cigarettes     Start date: 2008     Quit date: 2014     Years since quittin.8    Smokeless tobacco: Never   Vaping Use    Vaping Use: Every day    Substances: Nicotine   Substance and Sexual Activity    Alcohol use: Yes     Comment: socially    Drug use: No     Comment: denies    Sexual activity: Yes     Partners: Male     Comment: None       CURRENT MEDICATIONS  Home Medications       Reviewed by Dm Chiang R.N. (Registered Nurse) on 23 at 0430  Med List Status: Partial     Medication Last Dose Status        Patient Miguel Taking any Medications                           ALLERGIES  Allergies   Allergen Reactions    Other Drug Anaphylaxis     IV contrast for CT, pt had respiratory distress and nausea     Morphine      Chest pain       PHYSICAL EXAM  VITAL SIGNS: /85   Pulse 86   Temp 36.3 °C (97.4 °F) (Temporal)   Resp 18   Ht 1.753 m (5' 9\")   Wt 124 kg (273 lb 2.4 oz)   LMP 2023   SpO2 97%   BMI 40.34 kg/m²    General:  WDWN female, nontoxic appearing in NAD; A+Ox3; V/S as above; afebrile  Skin: warm and dry; good color; no rash  HEENT: NCAT; EOMs intact; PERRL; no scleral icterus   Neck: FROM  Cardiovascular: Regular heart rate and rhythm.  No murmurs, rubs, or gallops; pulses 2+ bilaterally radially  Lungs: No respiratory distress or tachypnea; Clear to auscultation with good air movement bilaterally.  No wheezes, rhonchi, or rales.   Abdomen: BS present; soft; NTND; no rebound, guarding, or rigidity.  No organomegaly or pulsatile mass; no mottling or CVA tenderness  Extremities: ELI x 4; no e/o trauma; no pedal edema; neg Tiffany's  Neurologic: CNs III-XII grossly intact; speech clear; distal sensation intact; strength 5/5 UE/LEs  Psychiatric: Appropriate affect, normal mood      DIAGNOSTIC STUDIES / PROCEDURES  LABS  Results for orders placed or performed during the hospital encounter of 23   CBC " WITH DIFFERENTIAL   Result Value Ref Range    WBC 5.9 4.8 - 10.8 K/uL    RBC 4.33 4.20 - 5.40 M/uL    Hemoglobin 13.0 12.0 - 16.0 g/dL    Hematocrit 37.9 37.0 - 47.0 %    MCV 87.5 81.4 - 97.8 fL    MCH 30.0 27.0 - 33.0 pg    MCHC 34.3 32.2 - 35.5 g/dL    RDW 38.8 35.9 - 50.0 fL    Platelet Count 194 164 - 446 K/uL    MPV 9.7 9.0 - 12.9 fL    Neutrophils-Polys 68.20 44.00 - 72.00 %    Lymphocytes 22.30 22.00 - 41.00 %    Monocytes 5.90 0.00 - 13.40 %    Eosinophils 2.90 0.00 - 6.90 %    Basophils 0.50 0.00 - 1.80 %    Immature Granulocytes 0.20 0.00 - 0.90 %    Nucleated RBC 0.00 0.00 - 0.20 /100 WBC    Neutrophils (Absolute) 4.03 1.82 - 7.42 K/uL    Lymphs (Absolute) 1.32 1.00 - 4.80 K/uL    Monos (Absolute) 0.35 0.00 - 0.85 K/uL    Eos (Absolute) 0.17 0.00 - 0.51 K/uL    Baso (Absolute) 0.03 0.00 - 0.12 K/uL    Immature Granulocytes (abs) 0.01 0.00 - 0.11 K/uL    NRBC (Absolute) 0.00 K/uL   COMP METABOLIC PANEL   Result Value Ref Range    Sodium 136 135 - 145 mmol/L    Potassium 3.9 3.6 - 5.5 mmol/L    Chloride 104 96 - 112 mmol/L    Co2 20 20 - 33 mmol/L    Anion Gap 12.0 7.0 - 16.0    Glucose 88 65 - 99 mg/dL    Bun 16 8 - 22 mg/dL    Creatinine 0.58 0.50 - 1.40 mg/dL    Calcium 8.7 8.5 - 10.5 mg/dL    Correct Calcium 8.7 8.5 - 10.5 mg/dL    AST(SGOT) 14 12 - 45 U/L    ALT(SGPT) 10 2 - 50 U/L    Alkaline Phosphatase 72 30 - 99 U/L    Total Bilirubin 0.4 0.1 - 1.5 mg/dL    Albumin 4.0 3.2 - 4.9 g/dL    Total Protein 7.0 6.0 - 8.2 g/dL    Globulin 3.0 1.9 - 3.5 g/dL    A-G Ratio 1.3 g/dL   LIPASE   Result Value Ref Range    Lipase 21 11 - 82 U/L   URINALYSIS,CULTURE IF INDICATED    Specimen: Urine   Result Value Ref Range    Color Yellow     Character Clear     Specific Gravity 1.025 <1.035    Ph 5.5 5.0 - 8.0    Glucose Negative Negative mg/dL    Ketones Trace (A) Negative mg/dL    Protein Negative Negative mg/dL    Bilirubin Negative Negative    Urobilinogen, Urine 0.2 Negative    Nitrite Negative Negative     Leukocyte Esterase Negative Negative    Occult Blood Negative Negative    Micro Urine Req see below    ESTIMATED GFR   Result Value Ref Range    GFR (CKD-EPI) 120 >60 mL/min/1.73 m 2         RADIOLOGY  Pelvic US: pending    COURSE & MEDICAL DECISION MAKING    ED Observation Status? No; Patient does not meet criteria for ED Observation.     INITIAL ASSESSMENT, COURSE AND PLAN  Care Narrative: This is a 38-year-old female who presented for lower abdominal pain.  Patient has a history of endometriosis and states this particular episode did not respond to the usual ibuprofen.  Patient has a soft, nontender, nonsurgical abdomen.  She has had an appendectomy.  There was difficulty establishing an IV so the patient and I decided on a plan for p.o. Zofran and Toradol IM with an ultrasound and the labs that had been drawn per abdominal pain protocol.    7:06 AM  I received a text from the patient's primary RN stating that she wished to sign out AMA.  Patient apparently left before the AMA paperwork could be completed and signed.      FINAL DIAGNOSIS  1. Lower abdominal pain    2. AMA       Electronically signed by: Alix Fontaine M.D., 12/1/2023 6:05 AM

## 2023-12-01 NOTE — ED NOTES
Jackie Jose expresses desire to leave. Risks in leaving ED before treatment given and diagnostics completed discussed with patient. EP completed AMA form and patient  signed form.

## 2023-12-01 NOTE — ED NOTES
Patient wishes to leave [facility]. She does not wish to proceed with any of the medical treatment the MD provided.

## 2023-12-01 NOTE — ED TRIAGE NOTES
Jackie Jose  38 y.o.  female  Chief Complaint   Patient presents with    Abdominal Pain     Brought in by [facility] c/o lower abdominal pain off and on x months. + nausea and dizziness.

## 2024-07-05 ENCOUNTER — HOSPITAL ENCOUNTER (EMERGENCY)
Facility: MEDICAL CENTER | Age: 39
End: 2024-07-05
Attending: EMERGENCY MEDICINE
Payer: MEDICAID

## 2024-07-05 ENCOUNTER — APPOINTMENT (OUTPATIENT)
Dept: RADIOLOGY | Facility: MEDICAL CENTER | Age: 39
End: 2024-07-05
Attending: EMERGENCY MEDICINE
Payer: MEDICAID

## 2024-07-05 VITALS
OXYGEN SATURATION: 96 % | WEIGHT: 271.83 LBS | HEART RATE: 65 BPM | BODY MASS INDEX: 40.26 KG/M2 | HEIGHT: 69 IN | TEMPERATURE: 97.5 F | DIASTOLIC BLOOD PRESSURE: 63 MMHG | RESPIRATION RATE: 18 BRPM | SYSTOLIC BLOOD PRESSURE: 106 MMHG

## 2024-07-05 DIAGNOSIS — R10.2 PELVIC PAIN: ICD-10-CM

## 2024-07-05 LAB
ALBUMIN SERPL BCP-MCNC: 4.2 G/DL (ref 3.2–4.9)
ALBUMIN/GLOB SERPL: 1.4 G/DL
ALP SERPL-CCNC: 72 U/L (ref 30–99)
ALT SERPL-CCNC: 9 U/L (ref 2–50)
ANION GAP SERPL CALC-SCNC: 12 MMOL/L (ref 7–16)
APPEARANCE UR: CLEAR
AST SERPL-CCNC: 8 U/L (ref 12–45)
BASOPHILS # BLD AUTO: 0.5 % (ref 0–1.8)
BASOPHILS # BLD: 0.03 K/UL (ref 0–0.12)
BILIRUB SERPL-MCNC: 0.4 MG/DL (ref 0.1–1.5)
BILIRUB UR QL STRIP.AUTO: NEGATIVE
BUN SERPL-MCNC: 23 MG/DL (ref 8–22)
C TRACH DNA GENITAL QL NAA+PROBE: NEGATIVE
CALCIUM ALBUM COR SERPL-MCNC: 9.2 MG/DL (ref 8.5–10.5)
CALCIUM SERPL-MCNC: 9.4 MG/DL (ref 8.5–10.5)
CANDIDA DNA VAG QL PROBE+SIG AMP: NEGATIVE
CHLORIDE SERPL-SCNC: 104 MMOL/L (ref 96–112)
CO2 SERPL-SCNC: 20 MMOL/L (ref 20–33)
COLOR UR: ABNORMAL
CREAT SERPL-MCNC: 0.66 MG/DL (ref 0.5–1.4)
EOSINOPHIL # BLD AUTO: 0.16 K/UL (ref 0–0.51)
EOSINOPHIL NFR BLD: 2.8 % (ref 0–6.9)
ERYTHROCYTE [DISTWIDTH] IN BLOOD BY AUTOMATED COUNT: 42.5 FL (ref 35.9–50)
G VAGINALIS DNA VAG QL PROBE+SIG AMP: NEGATIVE
GFR SERPLBLD CREATININE-BSD FMLA CKD-EPI: 116 ML/MIN/1.73 M 2
GLOBULIN SER CALC-MCNC: 3.1 G/DL (ref 1.9–3.5)
GLUCOSE SERPL-MCNC: 81 MG/DL (ref 65–99)
GLUCOSE UR STRIP.AUTO-MCNC: NEGATIVE MG/DL
HCG SERPL QL: NEGATIVE
HCT VFR BLD AUTO: 39.8 % (ref 37–47)
HGB BLD-MCNC: 13.4 G/DL (ref 12–16)
IMM GRANULOCYTES # BLD AUTO: 0.02 K/UL (ref 0–0.11)
IMM GRANULOCYTES NFR BLD AUTO: 0.4 % (ref 0–0.9)
KETONES UR STRIP.AUTO-MCNC: ABNORMAL MG/DL
LEUKOCYTE ESTERASE UR QL STRIP.AUTO: NEGATIVE
LIPASE SERPL-CCNC: 29 U/L (ref 11–82)
LYMPHOCYTES # BLD AUTO: 1.36 K/UL (ref 1–4.8)
LYMPHOCYTES NFR BLD: 23.9 % (ref 22–41)
MCH RBC QN AUTO: 29.8 PG (ref 27–33)
MCHC RBC AUTO-ENTMCNC: 33.7 G/DL (ref 32.2–35.5)
MCV RBC AUTO: 88.4 FL (ref 81.4–97.8)
MICRO URNS: ABNORMAL
MONOCYTES # BLD AUTO: 0.38 K/UL (ref 0–0.85)
MONOCYTES NFR BLD AUTO: 6.7 % (ref 0–13.4)
N GONORRHOEA DNA GENITAL QL NAA+PROBE: NEGATIVE
NEUTROPHILS # BLD AUTO: 3.75 K/UL (ref 1.82–7.42)
NEUTROPHILS NFR BLD: 65.7 % (ref 44–72)
NITRITE UR QL STRIP.AUTO: NEGATIVE
NRBC # BLD AUTO: 0 K/UL
NRBC BLD-RTO: 0 /100 WBC (ref 0–0.2)
PH UR STRIP.AUTO: 5.5 [PH] (ref 5–8)
PLATELET # BLD AUTO: 197 K/UL (ref 164–446)
PMV BLD AUTO: 10.5 FL (ref 9–12.9)
POTASSIUM SERPL-SCNC: 3.8 MMOL/L (ref 3.6–5.5)
PROT SERPL-MCNC: 7.3 G/DL (ref 6–8.2)
PROT UR QL STRIP: NEGATIVE MG/DL
RBC # BLD AUTO: 4.5 M/UL (ref 4.2–5.4)
RBC UR QL AUTO: NEGATIVE
SODIUM SERPL-SCNC: 136 MMOL/L (ref 135–145)
SP GR UR STRIP.AUTO: 1.04
SPECIMEN SOURCE: NORMAL
T VAGINALIS DNA VAG QL PROBE+SIG AMP: NEGATIVE
UROBILINOGEN UR STRIP.AUTO-MCNC: 1 MG/DL
WBC # BLD AUTO: 5.7 K/UL (ref 4.8–10.8)

## 2024-07-05 PROCEDURE — 99284 EMERGENCY DEPT VISIT MOD MDM: CPT

## 2024-07-05 PROCEDURE — 87660 TRICHOMONAS VAGIN DIR PROBE: CPT

## 2024-07-05 PROCEDURE — 84703 CHORIONIC GONADOTROPIN ASSAY: CPT

## 2024-07-05 PROCEDURE — 87480 CANDIDA DNA DIR PROBE: CPT

## 2024-07-05 PROCEDURE — 36415 COLL VENOUS BLD VENIPUNCTURE: CPT

## 2024-07-05 PROCEDURE — 83690 ASSAY OF LIPASE: CPT

## 2024-07-05 PROCEDURE — 700111 HCHG RX REV CODE 636 W/ 250 OVERRIDE (IP): Mod: JZ,UD | Performed by: EMERGENCY MEDICINE

## 2024-07-05 PROCEDURE — 85025 COMPLETE CBC W/AUTO DIFF WBC: CPT

## 2024-07-05 PROCEDURE — 87491 CHLMYD TRACH DNA AMP PROBE: CPT

## 2024-07-05 PROCEDURE — 96374 THER/PROPH/DIAG INJ IV PUSH: CPT

## 2024-07-05 PROCEDURE — 74176 CT ABD & PELVIS W/O CONTRAST: CPT

## 2024-07-05 PROCEDURE — 81003 URINALYSIS AUTO W/O SCOPE: CPT

## 2024-07-05 PROCEDURE — 87591 N.GONORRHOEAE DNA AMP PROB: CPT

## 2024-07-05 PROCEDURE — 80053 COMPREHEN METABOLIC PANEL: CPT

## 2024-07-05 PROCEDURE — 87510 GARDNER VAG DNA DIR PROBE: CPT

## 2024-07-05 RX ORDER — MELOXICAM 7.5 MG/1
15 TABLET ORAL DAILY
Qty: 30 TABLET | Refills: 0 | Status: SHIPPED | OUTPATIENT
Start: 2024-07-05 | End: 2024-07-20

## 2024-07-05 RX ORDER — KETOROLAC TROMETHAMINE 15 MG/ML
15 INJECTION, SOLUTION INTRAMUSCULAR; INTRAVENOUS ONCE
Status: COMPLETED | OUTPATIENT
Start: 2024-07-05 | End: 2024-07-05

## 2024-07-05 RX ADMIN — KETOROLAC TROMETHAMINE 15 MG: 15 INJECTION, SOLUTION INTRAMUSCULAR; INTRAVENOUS at 05:27

## 2024-07-05 ASSESSMENT — PAIN DESCRIPTION - PAIN TYPE
TYPE: CHRONIC PAIN
TYPE: ACUTE PAIN

## 2024-07-05 ASSESSMENT — FIBROSIS 4 INDEX: FIB4 SCORE: 1.14

## 2024-07-07 ENCOUNTER — OFFICE VISIT (OUTPATIENT)
Dept: URGENT CARE | Facility: CLINIC | Age: 39
End: 2024-07-07
Payer: MEDICAID

## 2024-07-07 VITALS
OXYGEN SATURATION: 98 % | HEIGHT: 70 IN | DIASTOLIC BLOOD PRESSURE: 76 MMHG | WEIGHT: 272.5 LBS | SYSTOLIC BLOOD PRESSURE: 130 MMHG | RESPIRATION RATE: 16 BRPM | TEMPERATURE: 96.6 F | HEART RATE: 100 BPM | BODY MASS INDEX: 39.01 KG/M2

## 2024-07-07 DIAGNOSIS — R10.2 PELVIC PAIN: ICD-10-CM

## 2024-07-07 PROCEDURE — 3078F DIAST BP <80 MM HG: CPT | Performed by: PHYSICIAN ASSISTANT

## 2024-07-07 PROCEDURE — 3075F SYST BP GE 130 - 139MM HG: CPT | Performed by: PHYSICIAN ASSISTANT

## 2024-07-07 PROCEDURE — 99214 OFFICE O/P EST MOD 30 MIN: CPT | Performed by: PHYSICIAN ASSISTANT

## 2024-07-07 RX ORDER — METHOCARBAMOL 500 MG/1
500 TABLET, FILM COATED ORAL 3 TIMES DAILY PRN
Qty: 15 TABLET | Refills: 0 | Status: SHIPPED | OUTPATIENT
Start: 2024-07-07 | End: 2024-07-12

## 2024-07-07 RX ORDER — HYDROCODONE BITARTRATE AND ACETAMINOPHEN 5; 325 MG/1; MG/1
1 TABLET ORAL EVERY 8 HOURS PRN
Qty: 15 TABLET | Refills: 0 | Status: SHIPPED | OUTPATIENT
Start: 2024-07-07 | End: 2024-07-12

## 2024-07-07 ASSESSMENT — ENCOUNTER SYMPTOMS
CONSTIPATION: 0
HEADACHES: 0
SHORTNESS OF BREATH: 0
NAUSEA: 1
ABDOMINAL PAIN: 1
FEVER: 0
COUGH: 0
SORE THROAT: 0
VOMITING: 1
MYALGIAS: 0
CHILLS: 0
DIARRHEA: 0
EYE PAIN: 0

## 2024-07-07 ASSESSMENT — FIBROSIS 4 INDEX: FIB4 SCORE: 0.5

## 2024-09-03 ENCOUNTER — OFFICE VISIT (OUTPATIENT)
Dept: URGENT CARE | Facility: CLINIC | Age: 39
End: 2024-09-03
Payer: MEDICAID

## 2024-09-03 VITALS
DIASTOLIC BLOOD PRESSURE: 64 MMHG | RESPIRATION RATE: 12 BRPM | OXYGEN SATURATION: 97 % | SYSTOLIC BLOOD PRESSURE: 118 MMHG | TEMPERATURE: 97.7 F | HEIGHT: 70 IN | HEART RATE: 80 BPM | BODY MASS INDEX: 37.65 KG/M2 | WEIGHT: 263 LBS

## 2024-09-03 DIAGNOSIS — K04.7 DENTAL INFECTION: ICD-10-CM

## 2024-09-03 PROCEDURE — 99213 OFFICE O/P EST LOW 20 MIN: CPT

## 2024-09-03 PROCEDURE — 3074F SYST BP LT 130 MM HG: CPT

## 2024-09-03 PROCEDURE — 3078F DIAST BP <80 MM HG: CPT

## 2024-09-03 RX ORDER — CHLORHEXIDINE GLUCONATE ORAL RINSE 1.2 MG/ML
15 SOLUTION DENTAL 2 TIMES DAILY
Qty: 118 ML | Refills: 1 | Status: SHIPPED | OUTPATIENT
Start: 2024-09-03

## 2024-09-03 RX ORDER — CLINDAMYCIN HCL 300 MG
300 CAPSULE ORAL 3 TIMES DAILY
Qty: 21 CAPSULE | Refills: 0 | Status: SHIPPED | OUTPATIENT
Start: 2024-09-03 | End: 2024-09-10

## 2024-09-03 ASSESSMENT — ENCOUNTER SYMPTOMS
CHILLS: 0
FEVER: 0

## 2024-09-03 ASSESSMENT — FIBROSIS 4 INDEX: FIB4 SCORE: 0.5

## 2024-09-03 NOTE — PROGRESS NOTES
CHIEF COMPLAINT  Chief Complaint   Patient presents with    Abscess     Started this morning, abscess on R side tooth, neck pain, sore throat     Subjective:   Jackie Jose is a 39 y.o. female who presents to urgent care with concern for abscess to her gum.  Patient reports symptoms started early this morning and feels similar to previous dental abscesses she has had in the past.  Patient reports symptoms of pain and swelling to her right lower gumline.  She denies any fevers or chills.  She did recently have all of her teeth extracted approximately 5 months ago.  Patient reports in the past clindamycin was the only antibiotic that seem to resolve her dental infections.        Review of Systems   Constitutional:  Negative for chills and fever.       PAST MEDICAL HISTORY  Patient Active Problem List    Diagnosis Date Noted    Medial collateral ligament sprain of knee 2014    Mesenteric lymphadenopathy 10/16/2014    Chronic abdominal pain 10/16/2014    Gastritis 10/16/2014    Intractable nausea and vomiting 10/04/2014    Splenomegaly 10/04/2014    Gastroenteritis 10/04/2014    Chest pain 2014    Vomiting 09/15/2013    Intractable pain 09/15/2013    Labor and delivery, indication for care 2013    Status post  2013    Noncompliant pregnant patient w/ labs 2013    Breech presentation 2013    Obese  BMI 38.8 2013    Heart palpitations 2013    Supervision of other high-risk pregnancy 2012    Previous  delivery, antepartum condition or complication 2012    S/P laparoscopy 2012       SURGICAL HISTORY   has a past surgical history that includes other orthopedic surgery (); travon by laparoscopy (2013); appendectomy (2012); gyn surgery; pelviscopy (2012); primary c section; and repeat c section w tubal ligation (2013).    ALLERGIES  Allergies   Allergen Reactions    Other Drug Anaphylaxis     IV contrast for CT, pt  "had respiratory distress and nausea     Morphine      Chest pain       CURRENT MEDICATIONS  Home Medications       Reviewed by Gustavo Crawford'analisa (Medical Assistant) on 09/03/24 at 0813  Med List Status: <None>     Medication Last Dose Status   diclofenac sodium (VOLTAREN) 1 % Gel PRN Active                    SOCIAL HISTORY  Social History     Tobacco Use    Smoking status: Former     Current packs/day: 0.00     Average packs/day: 0.5 packs/day for 6.0 years (3.0 ttl pk-yrs)     Types: Cigarettes     Start date: 1/16/2008     Quit date: 1/16/2014     Years since quitting: 10.6    Smokeless tobacco: Never   Vaping Use    Vaping status: Every Day    Substances: Nicotine   Substance and Sexual Activity    Alcohol use: Yes     Comment: socially    Drug use: No     Comment: denies    Sexual activity: Yes     Partners: Male     Comment: None       FAMILY HISTORY  Family History   Problem Relation Age of Onset    Hypertension Father     Bipolar disorder Sister         Middle sibling    Thyroid Maternal Grandmother     Alcohol/Drug Maternal Grandfather          Medications, Allergies, and current problem list reviewed today in Epic.     Objective:     /64   Pulse 80   Temp 36.5 °C (97.7 °F) (Temporal)   Resp 12   Ht 1.778 m (5' 10\")   Wt 119 kg (263 lb)   SpO2 97%     Physical Exam  Vitals reviewed.   Constitutional:       General: She is not in acute distress.     Appearance: Normal appearance. She is not ill-appearing or toxic-appearing.   HENT:      Head: Normocephalic.      Nose: Nose normal.      Mouth/Throat:      Mouth: Mucous membranes are moist. No oral lesions.      Dentition: Gingival swelling present.      Pharynx: Oropharynx is clear. Uvula midline. No pharyngeal swelling, oropharyngeal exudate, posterior oropharyngeal erythema or uvula swelling.      Tonsils: No tonsillar exudate or tonsillar abscesses. 0 on the right. 0 on the left.        Comments: Erythema and swelling to right lower " gumline.  Tenderness with exam.  No bleeding.  No drainage.  Cardiovascular:      Rate and Rhythm: Normal rate and regular rhythm.      Pulses: Normal pulses.      Heart sounds: Normal heart sounds.   Pulmonary:      Effort: Pulmonary effort is normal. No respiratory distress.      Breath sounds: Normal breath sounds.   Musculoskeletal:      Cervical back: Normal range of motion. No rigidity or tenderness.   Lymphadenopathy:      Cervical: No cervical adenopathy.   Skin:     General: Skin is warm.      Capillary Refill: Capillary refill takes less than 2 seconds.   Neurological:      General: No focal deficit present.      Mental Status: She is alert.   Psychiatric:         Mood and Affect: Mood normal.         Assessment/Plan:     Diagnosis and associated orders:     1. Dental infection  clindamycin (CLEOCIN) 300 MG Cap    chlorhexidine (PERIDEX) 0.12 % Solution         Comments/MDM:     Patient has stable vital signs and is non-toxic appearing. Discussed supportive care with warm salt water rinses after meals to help remove food debris as well as taking Tylenol/Ibuprofen as needed for pain. Antibiotics prescribed for dental infection.   Patient will follow up with dentist for further management.   Patient demonstrated understanding of treatment plan at this time and will RTC if symptoms worsen or fail to resolve.           Differential diagnosis, natural history, supportive care, and indications for immediate follow-up discussed.    Advised the patient to follow-up with the primary care physician for recheck, reevaluation, and consideration of further management.    Please note that this dictation was created using voice recognition software. I have made a reasonable attempt to correct obvious errors, but I expect that there are errors of grammar and possibly content that I did not discover before finalizing the note.    This note was electronically signed by NICOLAS Alvarado

## 2024-09-03 NOTE — LETTER
September 3, 2024    To Whom It May Concern:         This is confirmation that Jackie Reynoso Damon attended her scheduled appointment with NICOLAS Alvarado on 9/03/24. Please excuse from work today.          If you have any questions please do not hesitate to call me at the phone number listed below.    Sincerely,          LILO AlvaradoRLEWIS.  [phone]

## 2024-10-19 ENCOUNTER — APPOINTMENT (OUTPATIENT)
Dept: RADIOLOGY | Facility: MEDICAL CENTER | Age: 39
End: 2024-10-19
Attending: EMERGENCY MEDICINE
Payer: MEDICAID

## 2024-10-19 ENCOUNTER — HOSPITAL ENCOUNTER (EMERGENCY)
Facility: MEDICAL CENTER | Age: 39
End: 2024-10-19
Attending: EMERGENCY MEDICINE
Payer: MEDICAID

## 2024-10-19 VITALS
RESPIRATION RATE: 14 BRPM | SYSTOLIC BLOOD PRESSURE: 128 MMHG | HEIGHT: 69 IN | DIASTOLIC BLOOD PRESSURE: 68 MMHG | TEMPERATURE: 97.2 F | OXYGEN SATURATION: 99 % | HEART RATE: 75 BPM | WEIGHT: 262.57 LBS | BODY MASS INDEX: 38.89 KG/M2

## 2024-10-19 DIAGNOSIS — R20.2 PARESTHESIA: ICD-10-CM

## 2024-10-19 DIAGNOSIS — R07.9 CHEST PAIN, UNSPECIFIED TYPE: ICD-10-CM

## 2024-10-19 LAB
ALBUMIN SERPL BCP-MCNC: 3.7 G/DL (ref 3.2–4.9)
ALBUMIN/GLOB SERPL: 1.3 G/DL
ALP SERPL-CCNC: 80 U/L (ref 30–99)
ALT SERPL-CCNC: <5 U/L (ref 2–50)
ANION GAP SERPL CALC-SCNC: 11 MMOL/L (ref 7–16)
AST SERPL-CCNC: 18 U/L (ref 12–45)
BASOPHILS # BLD AUTO: 0.4 % (ref 0–1.8)
BASOPHILS # BLD: 0.03 K/UL (ref 0–0.12)
BILIRUB SERPL-MCNC: 0.4 MG/DL (ref 0.1–1.5)
BUN SERPL-MCNC: 19 MG/DL (ref 8–22)
CALCIUM ALBUM COR SERPL-MCNC: 8.9 MG/DL (ref 8.5–10.5)
CALCIUM SERPL-MCNC: 8.7 MG/DL (ref 8.5–10.5)
CHLORIDE SERPL-SCNC: 106 MMOL/L (ref 96–112)
CO2 SERPL-SCNC: 19 MMOL/L (ref 20–33)
CREAT SERPL-MCNC: 0.74 MG/DL (ref 0.5–1.4)
EKG IMPRESSION: NORMAL
EOSINOPHIL # BLD AUTO: 0.62 K/UL (ref 0–0.51)
EOSINOPHIL NFR BLD: 8.3 % (ref 0–6.9)
ERYTHROCYTE [DISTWIDTH] IN BLOOD BY AUTOMATED COUNT: 44.4 FL (ref 35.9–50)
GFR SERPLBLD CREATININE-BSD FMLA CKD-EPI: 106 ML/MIN/1.73 M 2
GLOBULIN SER CALC-MCNC: 2.9 G/DL (ref 1.9–3.5)
GLUCOSE SERPL-MCNC: 88 MG/DL (ref 65–99)
HCG SERPL QL: NEGATIVE
HCT VFR BLD AUTO: 40.9 % (ref 37–47)
HGB BLD-MCNC: 13.7 G/DL (ref 12–16)
IMM GRANULOCYTES # BLD AUTO: 0.02 K/UL (ref 0–0.11)
IMM GRANULOCYTES NFR BLD AUTO: 0.3 % (ref 0–0.9)
LYMPHOCYTES # BLD AUTO: 1.64 K/UL (ref 1–4.8)
LYMPHOCYTES NFR BLD: 21.9 % (ref 22–41)
MCH RBC QN AUTO: 30.1 PG (ref 27–33)
MCHC RBC AUTO-ENTMCNC: 33.5 G/DL (ref 32.2–35.5)
MCV RBC AUTO: 89.9 FL (ref 81.4–97.8)
MONOCYTES # BLD AUTO: 0.47 K/UL (ref 0–0.85)
MONOCYTES NFR BLD AUTO: 6.3 % (ref 0–13.4)
NEUTROPHILS # BLD AUTO: 4.72 K/UL (ref 1.82–7.42)
NEUTROPHILS NFR BLD: 62.8 % (ref 44–72)
NRBC # BLD AUTO: 0 K/UL
NRBC BLD-RTO: 0 /100 WBC (ref 0–0.2)
NT-PROBNP SERPL IA-MCNC: 82 PG/ML (ref 0–125)
PLATELET # BLD AUTO: 240 K/UL (ref 164–446)
PMV BLD AUTO: 9.7 FL (ref 9–12.9)
POTASSIUM SERPL-SCNC: 4 MMOL/L (ref 3.6–5.5)
PROT SERPL-MCNC: 6.6 G/DL (ref 6–8.2)
RBC # BLD AUTO: 4.55 M/UL (ref 4.2–5.4)
SODIUM SERPL-SCNC: 136 MMOL/L (ref 135–145)
TROPONIN T SERPL-MCNC: <6 NG/L (ref 6–19)
WBC # BLD AUTO: 7.5 K/UL (ref 4.8–10.8)

## 2024-10-19 PROCEDURE — 85025 COMPLETE CBC W/AUTO DIFF WBC: CPT

## 2024-10-19 PROCEDURE — 99284 EMERGENCY DEPT VISIT MOD MDM: CPT

## 2024-10-19 PROCEDURE — 80053 COMPREHEN METABOLIC PANEL: CPT

## 2024-10-19 PROCEDURE — 93005 ELECTROCARDIOGRAM TRACING: CPT | Performed by: EMERGENCY MEDICINE

## 2024-10-19 PROCEDURE — 84703 CHORIONIC GONADOTROPIN ASSAY: CPT

## 2024-10-19 PROCEDURE — 36415 COLL VENOUS BLD VENIPUNCTURE: CPT

## 2024-10-19 PROCEDURE — 83880 ASSAY OF NATRIURETIC PEPTIDE: CPT

## 2024-10-19 PROCEDURE — 93005 ELECTROCARDIOGRAM TRACING: CPT

## 2024-10-19 PROCEDURE — 71045 X-RAY EXAM CHEST 1 VIEW: CPT

## 2024-10-19 PROCEDURE — 84484 ASSAY OF TROPONIN QUANT: CPT

## 2024-10-19 RX ORDER — FAMOTIDINE 20 MG/1
20 TABLET, FILM COATED ORAL DAILY
Qty: 90 TABLET | Refills: 0 | Status: SHIPPED | OUTPATIENT
Start: 2024-10-19

## 2024-10-19 ASSESSMENT — FIBROSIS 4 INDEX: FIB4 SCORE: 0.5

## 2024-10-19 ASSESSMENT — PAIN DESCRIPTION - PAIN TYPE: TYPE: ACUTE PAIN

## 2024-10-29 ENCOUNTER — APPOINTMENT (OUTPATIENT)
Dept: ADMISSIONS | Facility: MEDICAL CENTER | Age: 39
End: 2024-10-29
Attending: OBSTETRICS & GYNECOLOGY
Payer: MEDICAID

## 2024-11-05 ENCOUNTER — APPOINTMENT (OUTPATIENT)
Dept: ADMISSIONS | Facility: MEDICAL CENTER | Age: 39
End: 2024-11-05
Attending: OBSTETRICS & GYNECOLOGY
Payer: MEDICAID

## 2024-11-05 NOTE — OR NURSING
Preadmit phone call completed with patient. Pt states understanding of instructions. Pt encouraged to increase oral hydration the day prior to surgery.

## 2024-11-20 ENCOUNTER — ANESTHESIA EVENT (OUTPATIENT)
Dept: SURGERY | Facility: MEDICAL CENTER | Age: 39
End: 2024-11-20
Payer: MEDICAID

## 2024-11-20 NOTE — OR NURSING
Preadmit: Call to patient to encourage increased oral fluid intake the day prior to procedure/surgery including intake of electrolyte drinks such as Gatorade or electrolyte water. Patient may have clear liquids until 2 hours prior to surgery.  Surgery date 11/21/24

## 2024-11-21 ENCOUNTER — ANESTHESIA (OUTPATIENT)
Dept: SURGERY | Facility: MEDICAL CENTER | Age: 39
End: 2024-11-21
Payer: MEDICAID

## 2024-11-21 ENCOUNTER — HOSPITAL ENCOUNTER (OUTPATIENT)
Facility: MEDICAL CENTER | Age: 39
End: 2024-11-22
Attending: SPECIALIST | Admitting: SPECIALIST
Payer: MEDICAID

## 2024-11-21 DIAGNOSIS — G89.18 POSTOPERATIVE PAIN: ICD-10-CM

## 2024-11-21 DIAGNOSIS — R14.1 ABDOMINAL GAS PAIN: ICD-10-CM

## 2024-11-21 DIAGNOSIS — K59.01 CONSTIPATION BY DELAYED COLONIC TRANSIT: ICD-10-CM

## 2024-11-21 PROBLEM — Z90.711 STATUS POST LAPAROSCOPIC SUPRACERVICAL HYSTERECTOMY: Status: ACTIVE | Noted: 2024-11-21

## 2024-11-21 LAB
HCG UR QL: NEGATIVE
PATHOLOGY CONSULT NOTE: NORMAL

## 2024-11-21 PROCEDURE — 160048 HCHG OR STATISTICAL LEVEL 1-5: Performed by: SPECIALIST

## 2024-11-21 PROCEDURE — 700102 HCHG RX REV CODE 250 W/ 637 OVERRIDE(OP): Mod: UD | Performed by: SPECIALIST

## 2024-11-21 PROCEDURE — 160036 HCHG PACU - EA ADDL 30 MINS PHASE I: Performed by: SPECIALIST

## 2024-11-21 PROCEDURE — G0378 HOSPITAL OBSERVATION PER HR: HCPCS

## 2024-11-21 PROCEDURE — 160029 HCHG SURGERY MINUTES - 1ST 30 MINS LEVEL 4: Performed by: SPECIALIST

## 2024-11-21 PROCEDURE — A9270 NON-COVERED ITEM OR SERVICE: HCPCS | Mod: UD | Performed by: SPECIALIST

## 2024-11-21 PROCEDURE — A9270 NON-COVERED ITEM OR SERVICE: HCPCS | Mod: UD | Performed by: ANESTHESIOLOGY

## 2024-11-21 PROCEDURE — 160002 HCHG RECOVERY MINUTES (STAT): Performed by: SPECIALIST

## 2024-11-21 PROCEDURE — 700101 HCHG RX REV CODE 250: Mod: UD | Performed by: SPECIALIST

## 2024-11-21 PROCEDURE — 160035 HCHG PACU - 1ST 60 MINS PHASE I: Performed by: SPECIALIST

## 2024-11-21 PROCEDURE — 160009 HCHG ANES TIME/MIN: Performed by: SPECIALIST

## 2024-11-21 PROCEDURE — 700105 HCHG RX REV CODE 258: Mod: UD | Performed by: ANESTHESIOLOGY

## 2024-11-21 PROCEDURE — 700111 HCHG RX REV CODE 636 W/ 250 OVERRIDE (IP): Mod: UD | Performed by: ANESTHESIOLOGY

## 2024-11-21 PROCEDURE — 700111 HCHG RX REV CODE 636 W/ 250 OVERRIDE (IP): Mod: JZ,UD | Performed by: ANESTHESIOLOGY

## 2024-11-21 PROCEDURE — 700102 HCHG RX REV CODE 250 W/ 637 OVERRIDE(OP): Mod: UD | Performed by: ANESTHESIOLOGY

## 2024-11-21 PROCEDURE — 88307 TISSUE EXAM BY PATHOLOGIST: CPT

## 2024-11-21 PROCEDURE — 700105 HCHG RX REV CODE 258: Mod: UD | Performed by: SPECIALIST

## 2024-11-21 PROCEDURE — 160041 HCHG SURGERY MINUTES - EA ADDL 1 MIN LEVEL 4: Performed by: SPECIALIST

## 2024-11-21 PROCEDURE — 81025 URINE PREGNANCY TEST: CPT

## 2024-11-21 PROCEDURE — 700101 HCHG RX REV CODE 250: Performed by: ANESTHESIOLOGY

## 2024-11-21 RX ORDER — HYDROMORPHONE HYDROCHLORIDE 1 MG/ML
0.2 INJECTION, SOLUTION INTRAMUSCULAR; INTRAVENOUS; SUBCUTANEOUS
Status: DISCONTINUED | OUTPATIENT
Start: 2024-11-21 | End: 2024-11-21 | Stop reason: HOSPADM

## 2024-11-21 RX ORDER — EPHEDRINE SULFATE 50 MG/ML
5 INJECTION, SOLUTION INTRAVENOUS
Status: DISCONTINUED | OUTPATIENT
Start: 2024-11-21 | End: 2024-11-21 | Stop reason: HOSPADM

## 2024-11-21 RX ORDER — SIMETHICONE 125 MG
125 TABLET,CHEWABLE ORAL 3 TIMES DAILY PRN
Status: DISCONTINUED | OUTPATIENT
Start: 2024-11-21 | End: 2024-11-22 | Stop reason: HOSPADM

## 2024-11-21 RX ORDER — LIDOCAINE HYDROCHLORIDE 20 MG/ML
INJECTION, SOLUTION EPIDURAL; INFILTRATION; INTRACAUDAL; PERINEURAL PRN
Status: DISCONTINUED | OUTPATIENT
Start: 2024-11-21 | End: 2024-11-21 | Stop reason: SURG

## 2024-11-21 RX ORDER — MEPERIDINE HYDROCHLORIDE 25 MG/ML
6.25 INJECTION INTRAMUSCULAR; INTRAVENOUS; SUBCUTANEOUS
Status: DISCONTINUED | OUTPATIENT
Start: 2024-11-21 | End: 2024-11-21 | Stop reason: HOSPADM

## 2024-11-21 RX ORDER — AMOXICILLIN 250 MG
1 CAPSULE ORAL NIGHTLY
Status: DISCONTINUED | OUTPATIENT
Start: 2024-11-21 | End: 2024-11-22 | Stop reason: HOSPADM

## 2024-11-21 RX ORDER — IBUPROFEN 800 MG/1
800 TABLET, FILM COATED ORAL 3 TIMES DAILY
Status: DISCONTINUED | OUTPATIENT
Start: 2024-11-21 | End: 2024-11-22 | Stop reason: HOSPADM

## 2024-11-21 RX ORDER — ONDANSETRON 2 MG/ML
4 INJECTION INTRAMUSCULAR; INTRAVENOUS EVERY 4 HOURS PRN
Status: DISCONTINUED | OUTPATIENT
Start: 2024-11-21 | End: 2024-11-22 | Stop reason: HOSPADM

## 2024-11-21 RX ORDER — DIPHENHYDRAMINE HYDROCHLORIDE 50 MG/ML
12.5 INJECTION INTRAMUSCULAR; INTRAVENOUS
Status: DISCONTINUED | OUTPATIENT
Start: 2024-11-21 | End: 2024-11-21 | Stop reason: HOSPADM

## 2024-11-21 RX ORDER — DEXAMETHASONE SODIUM PHOSPHATE 4 MG/ML
INJECTION, SOLUTION INTRA-ARTICULAR; INTRALESIONAL; INTRAMUSCULAR; INTRAVENOUS; SOFT TISSUE PRN
Status: DISCONTINUED | OUTPATIENT
Start: 2024-11-21 | End: 2024-11-21 | Stop reason: SURG

## 2024-11-21 RX ORDER — OXYCODONE HCL 5 MG/5 ML
5 SOLUTION, ORAL ORAL
Status: COMPLETED | OUTPATIENT
Start: 2024-11-21 | End: 2024-11-21

## 2024-11-21 RX ORDER — SCOLOPAMINE TRANSDERMAL SYSTEM 1 MG/1
1 PATCH, EXTENDED RELEASE TRANSDERMAL
Status: DISCONTINUED | OUTPATIENT
Start: 2024-11-21 | End: 2024-11-22 | Stop reason: HOSPADM

## 2024-11-21 RX ORDER — LABETALOL HYDROCHLORIDE 5 MG/ML
5 INJECTION, SOLUTION INTRAVENOUS
Status: DISCONTINUED | OUTPATIENT
Start: 2024-11-21 | End: 2024-11-21 | Stop reason: HOSPADM

## 2024-11-21 RX ORDER — CEFAZOLIN SODIUM 1 G/3ML
INJECTION, POWDER, FOR SOLUTION INTRAMUSCULAR; INTRAVENOUS PRN
Status: DISCONTINUED | OUTPATIENT
Start: 2024-11-21 | End: 2024-11-21 | Stop reason: SURG

## 2024-11-21 RX ORDER — OXYCODONE HYDROCHLORIDE 10 MG/1
10 TABLET ORAL
Status: DISCONTINUED | OUTPATIENT
Start: 2024-11-21 | End: 2024-11-22 | Stop reason: HOSPADM

## 2024-11-21 RX ORDER — BISACODYL 10 MG
10 SUPPOSITORY, RECTAL RECTAL
Status: DISCONTINUED | OUTPATIENT
Start: 2024-11-21 | End: 2024-11-22 | Stop reason: HOSPADM

## 2024-11-21 RX ORDER — ACETAMINOPHEN 500 MG
1000 TABLET ORAL ONCE
Status: COMPLETED | OUTPATIENT
Start: 2024-11-21 | End: 2024-11-21

## 2024-11-21 RX ORDER — HYDRALAZINE HYDROCHLORIDE 20 MG/ML
5 INJECTION INTRAMUSCULAR; INTRAVENOUS
Status: DISCONTINUED | OUTPATIENT
Start: 2024-11-21 | End: 2024-11-21 | Stop reason: HOSPADM

## 2024-11-21 RX ORDER — HYDROMORPHONE HYDROCHLORIDE 1 MG/ML
0.1 INJECTION, SOLUTION INTRAMUSCULAR; INTRAVENOUS; SUBCUTANEOUS
Status: DISCONTINUED | OUTPATIENT
Start: 2024-11-21 | End: 2024-11-21 | Stop reason: HOSPADM

## 2024-11-21 RX ORDER — KETOROLAC TROMETHAMINE 15 MG/ML
INJECTION, SOLUTION INTRAMUSCULAR; INTRAVENOUS PRN
Status: DISCONTINUED | OUTPATIENT
Start: 2024-11-21 | End: 2024-11-21 | Stop reason: SURG

## 2024-11-21 RX ORDER — ONDANSETRON 2 MG/ML
INJECTION INTRAMUSCULAR; INTRAVENOUS PRN
Status: DISCONTINUED | OUTPATIENT
Start: 2024-11-21 | End: 2024-11-21 | Stop reason: SURG

## 2024-11-21 RX ORDER — BUPIVACAINE HYDROCHLORIDE AND EPINEPHRINE 2.5; 5 MG/ML; UG/ML
INJECTION, SOLUTION EPIDURAL; INFILTRATION; INTRACAUDAL; PERINEURAL
Status: DISCONTINUED | OUTPATIENT
Start: 2024-11-21 | End: 2024-11-21 | Stop reason: HOSPADM

## 2024-11-21 RX ORDER — ALBUTEROL SULFATE 5 MG/ML
2.5 SOLUTION RESPIRATORY (INHALATION)
Status: DISCONTINUED | OUTPATIENT
Start: 2024-11-21 | End: 2024-11-21 | Stop reason: HOSPADM

## 2024-11-21 RX ORDER — OXYCODONE HCL 5 MG/5 ML
5 SOLUTION, ORAL ORAL
Status: DISCONTINUED | OUTPATIENT
Start: 2024-11-21 | End: 2024-11-21 | Stop reason: HOSPADM

## 2024-11-21 RX ORDER — ONDANSETRON 2 MG/ML
4 INJECTION INTRAMUSCULAR; INTRAVENOUS
Status: COMPLETED | OUTPATIENT
Start: 2024-11-21 | End: 2024-11-21

## 2024-11-21 RX ORDER — HALOPERIDOL 5 MG/ML
1 INJECTION INTRAMUSCULAR
Status: DISCONTINUED | OUTPATIENT
Start: 2024-11-21 | End: 2024-11-21 | Stop reason: HOSPADM

## 2024-11-21 RX ORDER — SODIUM CHLORIDE 9 MG/ML
INJECTION, SOLUTION INTRAVENOUS
Status: DISCONTINUED | OUTPATIENT
Start: 2024-11-21 | End: 2024-11-21 | Stop reason: SURG

## 2024-11-21 RX ORDER — BUPIVACAINE HYDROCHLORIDE AND EPINEPHRINE 2.5; 5 MG/ML; UG/ML
INJECTION, SOLUTION EPIDURAL; INFILTRATION; INTRACAUDAL; PERINEURAL
Status: DISPENSED
Start: 2024-11-21 | End: 2024-11-21

## 2024-11-21 RX ORDER — SODIUM CHLORIDE 9 MG/ML
INJECTION, SOLUTION INTRAVENOUS CONTINUOUS
Status: DISCONTINUED | OUTPATIENT
Start: 2024-11-21 | End: 2024-11-22 | Stop reason: HOSPADM

## 2024-11-21 RX ORDER — OXYCODONE HYDROCHLORIDE 5 MG/1
5 TABLET ORAL
Status: DISCONTINUED | OUTPATIENT
Start: 2024-11-21 | End: 2024-11-22 | Stop reason: HOSPADM

## 2024-11-21 RX ORDER — HYDROMORPHONE HYDROCHLORIDE 1 MG/ML
0.4 INJECTION, SOLUTION INTRAMUSCULAR; INTRAVENOUS; SUBCUTANEOUS
Status: DISCONTINUED | OUTPATIENT
Start: 2024-11-21 | End: 2024-11-21 | Stop reason: HOSPADM

## 2024-11-21 RX ORDER — POLYETHYLENE GLYCOL 3350 17 G/17G
1 POWDER, FOR SOLUTION ORAL 2 TIMES DAILY PRN
Status: DISCONTINUED | OUTPATIENT
Start: 2024-11-21 | End: 2024-11-22 | Stop reason: HOSPADM

## 2024-11-21 RX ORDER — SCOLOPAMINE TRANSDERMAL SYSTEM 1 MG/1
1 PATCH, EXTENDED RELEASE TRANSDERMAL
Status: DISCONTINUED | OUTPATIENT
Start: 2024-11-24 | End: 2024-11-22 | Stop reason: HOSPADM

## 2024-11-21 RX ORDER — AMOXICILLIN 250 MG
1 CAPSULE ORAL
Status: DISCONTINUED | OUTPATIENT
Start: 2024-11-21 | End: 2024-11-22 | Stop reason: HOSPADM

## 2024-11-21 RX ORDER — METOCLOPRAMIDE HYDROCHLORIDE 5 MG/ML
INJECTION INTRAMUSCULAR; INTRAVENOUS PRN
Status: DISCONTINUED | OUTPATIENT
Start: 2024-11-21 | End: 2024-11-21 | Stop reason: SURG

## 2024-11-21 RX ORDER — OXYCODONE HCL 5 MG/5 ML
10 SOLUTION, ORAL ORAL
Status: COMPLETED | OUTPATIENT
Start: 2024-11-21 | End: 2024-11-21

## 2024-11-21 RX ORDER — HYDROMORPHONE HYDROCHLORIDE 1 MG/ML
0.5 INJECTION, SOLUTION INTRAMUSCULAR; INTRAVENOUS; SUBCUTANEOUS
Status: DISCONTINUED | OUTPATIENT
Start: 2024-11-21 | End: 2024-11-22 | Stop reason: HOSPADM

## 2024-11-21 RX ORDER — ROCURONIUM BROMIDE 10 MG/ML
INJECTION, SOLUTION INTRAVENOUS PRN
Status: DISCONTINUED | OUTPATIENT
Start: 2024-11-21 | End: 2024-11-21 | Stop reason: SURG

## 2024-11-21 RX ORDER — SCOLOPAMINE TRANSDERMAL SYSTEM 1 MG/1
PATCH, EXTENDED RELEASE TRANSDERMAL
Status: DISPENSED
Start: 2024-11-21 | End: 2024-11-21

## 2024-11-21 RX ORDER — DIPHENHYDRAMINE HYDROCHLORIDE 50 MG/ML
25 INJECTION INTRAMUSCULAR; INTRAVENOUS EVERY 6 HOURS PRN
Status: DISCONTINUED | OUTPATIENT
Start: 2024-11-21 | End: 2024-11-22 | Stop reason: HOSPADM

## 2024-11-21 RX ORDER — ACETAMINOPHEN 500 MG
1000 TABLET ORAL EVERY 6 HOURS PRN
Status: DISCONTINUED | OUTPATIENT
Start: 2024-11-26 | End: 2024-11-22 | Stop reason: HOSPADM

## 2024-11-21 RX ORDER — DEXAMETHASONE SODIUM PHOSPHATE 4 MG/ML
4 INJECTION, SOLUTION INTRA-ARTICULAR; INTRALESIONAL; INTRAMUSCULAR; INTRAVENOUS; SOFT TISSUE
Status: DISCONTINUED | OUTPATIENT
Start: 2024-11-21 | End: 2024-11-22 | Stop reason: HOSPADM

## 2024-11-21 RX ORDER — DOCUSATE SODIUM 100 MG/1
100 CAPSULE, LIQUID FILLED ORAL 2 TIMES DAILY
Status: DISCONTINUED | OUTPATIENT
Start: 2024-11-21 | End: 2024-11-22 | Stop reason: HOSPADM

## 2024-11-21 RX ORDER — HALOPERIDOL 5 MG/ML
1 INJECTION INTRAMUSCULAR EVERY 6 HOURS PRN
Status: DISCONTINUED | OUTPATIENT
Start: 2024-11-21 | End: 2024-11-22 | Stop reason: HOSPADM

## 2024-11-21 RX ORDER — SODIUM PHOSPHATE,MONO-DIBASIC 19G-7G/118
1 ENEMA (ML) RECTAL
Status: DISCONTINUED | OUTPATIENT
Start: 2024-11-21 | End: 2024-11-22 | Stop reason: HOSPADM

## 2024-11-21 RX ORDER — HYDROMORPHONE HYDROCHLORIDE 2 MG/ML
INJECTION, SOLUTION INTRAMUSCULAR; INTRAVENOUS; SUBCUTANEOUS PRN
Status: DISCONTINUED | OUTPATIENT
Start: 2024-11-21 | End: 2024-11-21 | Stop reason: SURG

## 2024-11-21 RX ORDER — ACETAMINOPHEN 500 MG
1000 TABLET ORAL EVERY 6 HOURS
Status: DISCONTINUED | OUTPATIENT
Start: 2024-11-21 | End: 2024-11-22 | Stop reason: HOSPADM

## 2024-11-21 RX ORDER — OXYCODONE HCL 5 MG/5 ML
10 SOLUTION, ORAL ORAL
Status: DISCONTINUED | OUTPATIENT
Start: 2024-11-21 | End: 2024-11-21 | Stop reason: HOSPADM

## 2024-11-21 RX ORDER — IBUPROFEN 800 MG/1
800 TABLET, FILM COATED ORAL 3 TIMES DAILY PRN
Status: DISCONTINUED | OUTPATIENT
Start: 2024-11-26 | End: 2024-11-22 | Stop reason: HOSPADM

## 2024-11-21 RX ORDER — ONDANSETRON 2 MG/ML
4 INJECTION INTRAMUSCULAR; INTRAVENOUS
Status: DISCONTINUED | OUTPATIENT
Start: 2024-11-21 | End: 2024-11-21 | Stop reason: HOSPADM

## 2024-11-21 RX ADMIN — ONDANSETRON 4 MG: 2 INJECTION INTRAMUSCULAR; INTRAVENOUS at 15:56

## 2024-11-21 RX ADMIN — HYDROMORPHONE HYDROCHLORIDE 0.4 MG: 2 INJECTION INTRAMUSCULAR; INTRAVENOUS; SUBCUTANEOUS at 14:06

## 2024-11-21 RX ADMIN — DEXAMETHASONE SODIUM PHOSPHATE 8 MG: 4 INJECTION INTRA-ARTICULAR; INTRALESIONAL; INTRAMUSCULAR; INTRAVENOUS; SOFT TISSUE at 11:39

## 2024-11-21 RX ADMIN — DOCUSATE SODIUM 100 MG: 100 CAPSULE, LIQUID FILLED ORAL at 18:26

## 2024-11-21 RX ADMIN — SIMETHICONE 125 MG: 125 TABLET, CHEWABLE ORAL at 18:26

## 2024-11-21 RX ADMIN — CEFAZOLIN 2 G: 1 INJECTION, POWDER, FOR SOLUTION INTRAMUSCULAR; INTRAVENOUS at 11:44

## 2024-11-21 RX ADMIN — SUGAMMADEX 200 MG: 100 INJECTION, SOLUTION INTRAVENOUS at 14:47

## 2024-11-21 RX ADMIN — IBUPROFEN 800 MG: 800 TABLET, FILM COATED ORAL at 19:50

## 2024-11-21 RX ADMIN — ONDANSETRON 4 MG: 2 INJECTION INTRAMUSCULAR; INTRAVENOUS at 13:57

## 2024-11-21 RX ADMIN — Medication 25 MG: at 12:00

## 2024-11-21 RX ADMIN — HYDROMORPHONE HYDROCHLORIDE 0.6 MG: 2 INJECTION INTRAMUSCULAR; INTRAVENOUS; SUBCUTANEOUS at 12:15

## 2024-11-21 RX ADMIN — FENTANYL CITRATE 50 MCG: 50 INJECTION, SOLUTION INTRAMUSCULAR; INTRAVENOUS at 14:06

## 2024-11-21 RX ADMIN — METOCLOPRAMIDE HYDROCHLORIDE 10 MG: 5 INJECTION INTRAMUSCULAR; INTRAVENOUS at 11:39

## 2024-11-21 RX ADMIN — ROCURONIUM BROMIDE 70 MG: 50 INJECTION, SOLUTION INTRAVENOUS at 11:39

## 2024-11-21 RX ADMIN — OXYCODONE HYDROCHLORIDE 10 MG: 5 SOLUTION ORAL at 15:37

## 2024-11-21 RX ADMIN — FENTANYL CITRATE 50 MCG: 50 INJECTION, SOLUTION INTRAMUSCULAR; INTRAVENOUS at 16:02

## 2024-11-21 RX ADMIN — HALOPERIDOL LACTATE 1 MG: 5 INJECTION, SOLUTION INTRAMUSCULAR at 16:43

## 2024-11-21 RX ADMIN — ROCURONIUM BROMIDE 15 MG: 50 INJECTION, SOLUTION INTRAVENOUS at 13:14

## 2024-11-21 RX ADMIN — HYDROMORPHONE HYDROCHLORIDE 0.6 MG: 2 INJECTION INTRAMUSCULAR; INTRAVENOUS; SUBCUTANEOUS at 14:25

## 2024-11-21 RX ADMIN — ACETAMINOPHEN 500 MG: 500 TABLET ORAL at 11:25

## 2024-11-21 RX ADMIN — SCOPOLAMINE 1 PATCH: 1.5 PATCH, EXTENDED RELEASE TRANSDERMAL at 11:25

## 2024-11-21 RX ADMIN — SODIUM CHLORIDE: 9 INJECTION, SOLUTION INTRAVENOUS at 11:33

## 2024-11-21 RX ADMIN — FENTANYL CITRATE 50 MCG: 50 INJECTION, SOLUTION INTRAMUSCULAR; INTRAVENOUS at 11:38

## 2024-11-21 RX ADMIN — Medication 25 MG: at 13:00

## 2024-11-21 RX ADMIN — FENTANYL CITRATE 50 MCG: 50 INJECTION, SOLUTION INTRAMUSCULAR; INTRAVENOUS at 15:37

## 2024-11-21 RX ADMIN — LIDOCAINE HYDROCHLORIDE 100 MG: 20 INJECTION, SOLUTION EPIDURAL; INFILTRATION; INTRACAUDAL; PERINEURAL at 11:39

## 2024-11-21 RX ADMIN — SENNOSIDES AND DOCUSATE SODIUM 1 TABLET: 50; 8.6 TABLET ORAL at 20:50

## 2024-11-21 RX ADMIN — ACETAMINOPHEN 1000 MG: 500 TABLET ORAL at 18:26

## 2024-11-21 RX ADMIN — KETOROLAC TROMETHAMINE 15 MG: 15 INJECTION, SOLUTION INTRAMUSCULAR; INTRAVENOUS at 14:17

## 2024-11-21 RX ADMIN — PROPOFOL 250 MG: 10 INJECTION, EMULSION INTRAVENOUS at 11:39

## 2024-11-21 RX ADMIN — HYDROMORPHONE HYDROCHLORIDE 0.4 MG: 2 INJECTION INTRAMUSCULAR; INTRAVENOUS; SUBCUTANEOUS at 12:36

## 2024-11-21 RX ADMIN — SODIUM CHLORIDE: 9 INJECTION, SOLUTION INTRAVENOUS at 21:29

## 2024-11-21 RX ADMIN — PROPOFOL 25 MCG/KG/MIN: 10 INJECTION, EMULSION INTRAVENOUS at 11:45

## 2024-11-21 RX ADMIN — OXYCODONE HYDROCHLORIDE 10 MG: 10 TABLET ORAL at 20:49

## 2024-11-21 ASSESSMENT — PAIN DESCRIPTION - PAIN TYPE
TYPE: ACUTE PAIN
TYPE: SURGICAL PAIN;ACUTE PAIN
TYPE: ACUTE PAIN
TYPE: ACUTE PAIN
TYPE: SURGICAL PAIN
TYPE: ACUTE PAIN;SURGICAL PAIN

## 2024-11-21 ASSESSMENT — FIBROSIS 4 INDEX: FIB4 SCORE: 1.31

## 2024-11-21 NOTE — DISCHARGE INSTRUCTIONS
Please refer to handout for further discharge instructions.   If any questions arise, call your provider.  If your provider is not available, please feel free to call the [facility] at [phone].    MEDICATIONS: Resume taking daily medication.  Take prescribed pain medication with food.  If no medication is prescribed, you may take non-aspirin pain medication if needed.  PAIN MEDICATION CAN BE VERY CONSTIPATING.  Take a stool softener or laxative such as senokot, pericolace, or milk of magnesia if needed.    Last pain medication given at   Tylenol was given at 12:00 pm. OK to take next as soon as 6 pm (every 6 hours as needed)   Ibuprofen was given at 12:00 pm. OK to take next as soon as 8 pm (every 8 hours as needed)  Oxycodone was given at 2:00 pm. OK to take next as soon as 8 om (every 6 hours as needed)      You have a scopolamine patch in place that is helpful for nausea/vertigo.  This can stay in place for 3 days.  If you experience uncomfortable side effects (headache, severe dry mouth) you may take the patch off sooner.  Wash hands thoroughly with soap and water if the patch is touched with a bare hand and when you remove the patch.        What to Expect Post Anesthesia    Rest and take it easy for the first 24 hours.  A responsible adult is recommended to remain with you during that time.  It is normal to feel sleepy.  We encourage you to not do anything that requires balance, judgment or coordination.    FOR 24 HOURS DO NOT:  Drive, operate machinery or run household appliances.  Drink beer or alcoholic beverages.  Make important decisions or sign legal documents.    To avoid nausea, slowly advance diet as tolerated, avoiding spicy or greasy foods for the first day.  Add more substantial food to your diet according to your provider's instructions.  Babies can be fed formula or breast milk as soon as they are hungry.  INCREASE FLUIDS AND FIBER TO AVOID CONSTIPATION.    MILD FLU-LIKE SYMPTOMS ARE  NORMAL.  YOU MAY EXPERIENCE GENERALIZED MUSCLE ACHES, THROAT IRRITATION, HEADACHE AND/OR SOME NAUSEA.    PATIENT DISCHARGE EDUCATION INSTRUCTION SHEET    REASONS TO CALL YOUR OBSTETRICIAN  Persistent fever, shaking, chills (Temperature higher than 100.4) may indicate you have an infection  Heavy bleeding: soaking more than 1 pad per hour; Passing clots an egg-sized clot or bigger may mean you have an postpartum hemorrhage  Foul odor from vagina or bad smelling or discolored discharge or blood  Urinary pain, burning or frequency  Incision that is not healing, increased redness, swelling, tenderness or pain, or any pus from site may mean you have an infection  Redness, swelling, warmth, or painful to touch in the calf area of your leg may mean you have a blood clot  Severe or intensified depression, thoughts or feelings of wanting to hurt yourself or someone else   Pain in chest, obstructed breathing or shortness of breath (trouble catching your breath) may mean you are having a postpartum complication. Call your provider immediately   Headache that does not get better, even after taking medicine, a bad headache with vision changes or pain in the upper right area of your belly may mean you have high blood pressure or post birth preeclampsia. Call your provider immediately        WOUND CARE  Ask your physician for additional care instructions. In general:  Incision:  May shower and pat incision dry   Keep the incision clean and dry  There should not be any opening or pus from the incision  Continue to walk at home 3 times a day       URINATION AND BOWEL MOVEMENTS  Eating more fiber (bran cereal, fruits, and vegetables) and drinking plenty of fluids will help to avoid constipation  Urinary frequency and urgency after childbirth is normal  If you experience any urinary pain, burning or frequency call your provider

## 2024-11-21 NOTE — PROGRESS NOTES
1523: report received from Kendal VAZQUEZ, assumed care of pt    1532: updated spouse on pt's status in recovery    1535: pt complaining of 7/10 pain, medicated per MAR    1552:  brought to bedside    1635: report called to Daphney VAZQUEZ    1706: pt transferred to [facility] via [facility]. Belongings with patient at time of transfer.

## 2024-11-21 NOTE — ANESTHESIA PREPROCEDURE EVALUATION
Case: [id number] Date/Time: 11/21/24 1015    Procedures:       LAPAROSCOPIC SUPRACERVICAL HYSTERECTOMY WITH MINI LAPAROTOMY AND ALSO ANY AND ALL OTHER INDICATED PROCEDURES      MINILAPAROTOMY    Pre-op diagnosis: MENORRHAGIA, DYSMENORRHEA, DYSPAREUNIA    Location: [facility] ROOM 25 / SURGERY SAME DAY [facility]    Surgeons: Arsen Yanez M.D.            Relevant Problems   ANESTHESIA (within normal limits)      PULMONARY (within normal limits)      NEURO (within normal limits)      CARDIAC   (negative) CAD (coronary artery disease)   (negative) Dysrhythmia      ENDO (within normal limits)      Other   (positive) Mesenteric lymphadenopathy   (positive) S/P laparoscopy   (positive) Splenomegaly       Physical Exam    Airway   Mallampati: II       Cardiovascular    Dental   (+) upper dentures, lower dentures           Pulmonary    Abdominal    Neurological                Anesthesia Plan    ASA 2       Plan - general       Airway plan will be ETT          Induction: intravenous    Postoperative Plan: Postoperative administration of opioids is intended.    Pertinent diagnostic labs and testing reviewed    Informed Consent:    Anesthetic plan and risks discussed with patient.    Use of blood products discussed with: patient whom consented to blood products.

## 2024-11-21 NOTE — ANESTHESIA PROCEDURE NOTES
Airway    Date/Time: 11/21/2024 11:41 AM    Performed by: Nicole Lewis M.D.  Authorized by: Nicole Lewis M.D.    Location:  OR  Urgency:  Elective  Indications for Airway Management:  Anesthesia      Spontaneous Ventilation: absent    Sedation Level:  Deep  Preoxygenated: Yes    Patient Position:  Sniffing  Mask Difficulty Assessment:  1 - vent by mask  Final Airway Type:  Endotracheal airway  Final Endotracheal Airway:  ETT  Cuffed: Yes    Technique Used for Successful ETT Placement:  Direct laryngoscopy    Insertion Site:  Oral  Blade Type:  Amol  Laryngoscope Blade/Videolaryngoscope Blade Size:  3  ETT Size (mm):  7.0  Measured from:  Gums  ETT to Gums (cm):  22  Placement Verified by: auscultation and capnometry    Cormack-Lehane Classification:  Grade I - full view of glottis  Number of Attempts at Approach:  1

## 2024-11-21 NOTE — OR NURSING
1451: Pt arrived from OR to PACU 3. Connected to monitor. Report received from anesthesia & RN. VSS. Oxygen at 4L via mask. Breaths calm, even, and unlabored.  No signs of pain. Clean sarai pad in place. 4 abdominal incision sites- lower ABD site has small amount of shadowing present but is otherwise intact. Lam catheter draining yellow urine.     1501: Admission and ERAS order released.     1523: Hand off to Falguni VAZQUEZ.

## 2024-11-21 NOTE — ANESTHESIA TIME REPORT
Anesthesia Start and Stop Event Times       Date Time Event    11/21/2024 1110 Ready for Procedure     1133 Anesthesia Start     1455 Anesthesia Stop          Responsible Staff  11/21/24      Name Role Begin End    Nicole Lewis M.D. Anesth [id number]          Overtime Reason:  no overtime (within assigned shift)    Comments:

## 2024-11-21 NOTE — H&P
Jackie Jose  YOB: 1985  Date of today's admission/surgery:   Facility: [facility]    ID: The patient is a very pleasant 39-year-old multipara (para-3 with 3 previous  sections).    Chief complaint: The patient complains of menorrhagia accompanied by dysmenorrhea.    History of present illness:  The patient has been experiencing menorrhagia and dysmenorrhea and dyspareunia. It is likely that she has endometriosis. She is scheduled today to undergo laparoscopy with laparoscopic supracervical hysterectomy (specifically, laparoscopic supracervical hysterectomy with minilaparotomy and bilateral salpingectomy along with any and all other indicated procedures). Transvaginal and transabdominal pelvic ultrasound performed at [facility] on July 10, 2024 revealed according to the report evidence of uterine anomaly, namely arcuate uterus.    Past medical history:  The patient says she has no medical illnesses.    Past surgical history: The patient says she has had 3  sections. She says she has also undergone appendectomy and cholecystectomy and tonsillectomy with adenoidectomy.    Medications:  The patient says she takes no medications.    Allergies: The patient says she is allergic to morphine. She says she is allergic to IV contrast. She says she does find with oxycodone.    Social history: The patient says that she does vape. She denies consuming alcoholic beverages. She denies the use of recreational drugs.    Review of Systems:    General: The patient denies any fevers or chills or sweats.  Pulmonary: The patient denies any coughing or wheezing or chest pain or shortness of breath.  Cardiovascular: The patient denies any palpitations, chest pain, dyspnea.  Gastrointestinal: The patient denies any nausea, vomiting, diarrhea, constipation, hematochezia, melena.  Genitourinary: The patient  complains of menorrhagia and dysmenorrhea and dyspareunia and pelvic pain.  Musculoskeletal: The patient denies any arthralgias or myalgias.  Neurological: The patient denies any headaches or syncope or seizures.      Physical Exam:    Vital signs: The patient's vital signs are stable and she is afebrile.  General: The patient appears well developed and well-nourished and relaxed and alert and comfortable and in no apparent distress.  HEENT: Normocephalic, atraumatic, pupils equal, round, reactive to light and accommodation, extra ocular motions intact, pharynx clear.  There is no thyromegaly.  There is no cervical lymphadenopathy.  Chest: Heart regular rate and rhythm, with no murmurs or rubs or gallops.  The lungs are clear to auscultation bilaterally.  Abdomen: Examination of the patient's abdomen today with the patient in the dorsal supine position reveals that the abdomen is obese and that the abdomen is soft and nontender and nondistended and that there is no evidence of hepatomegaly and that there is no evidence of splenomegaly.  There is no evidence detected on today's abdominal exam of any abdominal masses.  Extremities: No clubbing or cyanosis or edema.  Neurological: Nonfocal.        Assessment:   Menorrhagia.  Dysmenorrhea.  Dyspareunia.  Pelvic pain.  Obesity.  The patient does vape.  Transvaginal and transabdominal pelvic ultrasound performed at [facility] on July 10, 2024 revealed according to the report evidence of uterine anomaly, namely arcuate uterus.    Plan:   We will proceed today with laparoscopic supracervical hysterectomy with minilaparotomy and bilateral salpingectomy as well as any and all other indicated procedures. I have discussed with the patient and explained to the patient in detail and at length what laparoscopic supracervical hysterectomy with minilaparotomy and bilateral salpingectomy as well as any and all other indicated procedures is and what laparoscopic  supracervical hysterectomy with minilaparotomy and bilateral salpingectomy as well as any and all other indicated procedures involves, and I have discussed with her and explained her in detail in it like the risks and benefits and alternatives of laparoscopic supracervical hysterectomy with minilaparotomy and bilateral salpingectomy as well as any and all other indicated procedures. After our discussions and after answering her question she told me that she very much wishes for us to proceed with laparoscopic supracervical hysterectomy with minilaparotomy and bilateral salpingectomy as well as any and all other indicated procedures.        __________________  Arsen Yanez M.D.

## 2024-11-21 NOTE — OR SURGEON
Immediate Post OP Note    PreOp Diagnosis:   Menorrhagia.  Dysmenorrhea.  Dyspareunia.  Pelvic pain.  Obesity.  The patient does vape.  Transvaginal and transabdominal pelvic ultrasound performed at [facility] on July 10, 2024 revealed according to the report evidence of uterine anomaly, namely arcuate uterus.    PostOp Diagnosis:   Menorrhagia.  Dysmenorrhea.  Dyspareunia.  Pelvic pain.  Obesity.  The patient does vape.  Transvaginal and transabdominal pelvic ultrasound performed at [facility] on July 10, 2024 revealed according to the report evidence of uterine anomaly, namely arcuate uterus.    Procedure(s):  LAPAROSCOPIC SUPRACERVICAL HYSTERECTOMY WITH MINI LAPAROTOMY, BILATERAL SALPINGECTOMY, LYSIS OF ADHESIONS - Wound Class: Clean Contaminated  MINILAPAROTOMY - Wound Class: Clean Contaminated    Surgeon(s):  DEBBIE Wolf M.D.    Anesthesiologist/Type of Anesthesia:  Anesthesiologist: Nicole Lewis M.D./General    Surgical Staff:  Circulator: Pooja Díaz R.N.  Relief Circulator: Ofelia Sadler R.N.  Relief Scrub: Maryse Bowie  Scrub Person: Viky Reardon    Specimens removed if any:  ID Type Source Tests Collected by Time Destination   A : uterine corpus, bilateral fallopian tubes filshie clips Other Other PATHOLOGY SPECIMEN Arsen Yanez M.D. 2024 12:17 PM        Estimated Blood Loss:   Approximately 100 cc's.     Findings:   Bimanual exam under anesthesia reveals no evidence of uterine enlargement and reveals no adnexal masses either on the right or the left and reveals that the uterus is freely movable.  Speculum exam under anesthesia reveals no vulvar or vaginal or cervical lesions and reveals that the vulva and vaginal mucosa are well estrogenized.  During laparoscopy excellent views of the pelvis are obtained.  Adhesions in the anterior aspect of the uterus are encountered consistent with a history of previous  sections.  Also  bilateral Filshie clips are seen.  Neither of these Filshie clips is located on a fallopian tube. Both ovaries are normal.  Serosal surfaces of the uterus appear normal.  The bladder is fairly adherent anteriorly.  There are some adhesions in the right lower quadrant ostensibly from previous appendectomy.    Complications:   None.        11/21/2024 2:44 PM Arsen Yanez M.D.

## 2024-11-22 ENCOUNTER — PHARMACY VISIT (OUTPATIENT)
Dept: PHARMACY | Facility: MEDICAL CENTER | Age: 39
End: 2024-11-22
Payer: COMMERCIAL

## 2024-11-22 VITALS
TEMPERATURE: 96.9 F | WEIGHT: 252.43 LBS | DIASTOLIC BLOOD PRESSURE: 73 MMHG | OXYGEN SATURATION: 95 % | SYSTOLIC BLOOD PRESSURE: 110 MMHG | BODY MASS INDEX: 36.14 KG/M2 | HEART RATE: 68 BPM | HEIGHT: 70 IN | RESPIRATION RATE: 17 BRPM

## 2024-11-22 PROBLEM — R10.2 PELVIC PAIN: Status: ACTIVE | Noted: 2024-11-22

## 2024-11-22 PROBLEM — N92.0 MENORRHAGIA: Status: ACTIVE | Noted: 2024-11-22

## 2024-11-22 PROBLEM — N94.6 DYSMENORRHEA: Status: ACTIVE | Noted: 2024-11-22

## 2024-11-22 PROBLEM — N94.10 DYSPAREUNIA IN FEMALE: Status: ACTIVE | Noted: 2024-11-22

## 2024-11-22 LAB
BASOPHILS # BLD AUTO: 0.1 % (ref 0–1.8)
BASOPHILS # BLD: 0.01 K/UL (ref 0–0.12)
EOSINOPHIL # BLD AUTO: 0.01 K/UL (ref 0–0.51)
EOSINOPHIL NFR BLD: 0.1 % (ref 0–6.9)
ERYTHROCYTE [DISTWIDTH] IN BLOOD BY AUTOMATED COUNT: 41.2 FL (ref 35.9–50)
HCT VFR BLD AUTO: 36.9 % (ref 37–47)
HGB BLD-MCNC: 12.6 G/DL (ref 12–16)
IMM GRANULOCYTES # BLD AUTO: 0.04 K/UL (ref 0–0.11)
IMM GRANULOCYTES NFR BLD AUTO: 0.4 % (ref 0–0.9)
LYMPHOCYTES # BLD AUTO: 0.88 K/UL (ref 1–4.8)
LYMPHOCYTES NFR BLD: 9.4 % (ref 22–41)
MCH RBC QN AUTO: 30.9 PG (ref 27–33)
MCHC RBC AUTO-ENTMCNC: 34.1 G/DL (ref 32.2–35.5)
MCV RBC AUTO: 90.4 FL (ref 81.4–97.8)
MONOCYTES # BLD AUTO: 0.59 K/UL (ref 0–0.85)
MONOCYTES NFR BLD AUTO: 6.3 % (ref 0–13.4)
NEUTROPHILS # BLD AUTO: 7.81 K/UL (ref 1.82–7.42)
NEUTROPHILS NFR BLD: 83.7 % (ref 44–72)
NRBC # BLD AUTO: 0 K/UL
NRBC BLD-RTO: 0 /100 WBC (ref 0–0.2)
PLATELET # BLD AUTO: 255 K/UL (ref 164–446)
PMV BLD AUTO: 9.6 FL (ref 9–12.9)
RBC # BLD AUTO: 4.08 M/UL (ref 4.2–5.4)
WBC # BLD AUTO: 9.3 K/UL (ref 4.8–10.8)

## 2024-11-22 PROCEDURE — 36415 COLL VENOUS BLD VENIPUNCTURE: CPT

## 2024-11-22 PROCEDURE — 85025 COMPLETE CBC W/AUTO DIFF WBC: CPT

## 2024-11-22 PROCEDURE — A9270 NON-COVERED ITEM OR SERVICE: HCPCS | Mod: UD | Performed by: SPECIALIST

## 2024-11-22 PROCEDURE — 700102 HCHG RX REV CODE 250 W/ 637 OVERRIDE(OP): Mod: UD | Performed by: SPECIALIST

## 2024-11-22 PROCEDURE — 700105 HCHG RX REV CODE 258: Mod: UD | Performed by: SPECIALIST

## 2024-11-22 PROCEDURE — RXMED WILLOW AMBULATORY MEDICATION CHARGE: Performed by: SPECIALIST

## 2024-11-22 PROCEDURE — G0378 HOSPITAL OBSERVATION PER HR: HCPCS

## 2024-11-22 RX ORDER — IBUPROFEN 800 MG/1
800 TABLET, FILM COATED ORAL EVERY 8 HOURS PRN
Qty: 30 TABLET | Refills: 0 | Status: SHIPPED | OUTPATIENT
Start: 2024-11-22

## 2024-11-22 RX ORDER — OXYCODONE AND ACETAMINOPHEN 5; 325 MG/1; MG/1
1 TABLET ORAL EVERY 6 HOURS PRN
Qty: 28 TABLET | Refills: 0 | Status: SHIPPED | OUTPATIENT
Start: 2024-11-22 | End: 2024-11-29

## 2024-11-22 RX ORDER — SENNA AND DOCUSATE SODIUM 50; 8.6 MG/1; MG/1
1 TABLET, FILM COATED ORAL DAILY
Qty: 30 TABLET | Refills: 11 | Status: SHIPPED | OUTPATIENT
Start: 2024-11-22

## 2024-11-22 RX ORDER — SIMETHICONE 40MG/0.6ML
40 SUSPENSION, DROPS(FINAL DOSAGE FORM)(ML) ORAL 4 TIMES DAILY PRN
Qty: 30 ML | Refills: 3 | Status: SHIPPED | OUTPATIENT
Start: 2024-11-22

## 2024-11-22 RX ADMIN — IBUPROFEN 800 MG: 800 TABLET, FILM COATED ORAL at 12:26

## 2024-11-22 RX ADMIN — ACETAMINOPHEN 1000 MG: 500 TABLET ORAL at 00:06

## 2024-11-22 RX ADMIN — OXYCODONE 5 MG: 5 TABLET ORAL at 07:34

## 2024-11-22 RX ADMIN — OXYCODONE HYDROCHLORIDE 10 MG: 10 TABLET ORAL at 10:46

## 2024-11-22 RX ADMIN — SODIUM CHLORIDE: 9 INJECTION, SOLUTION INTRAVENOUS at 06:06

## 2024-11-22 RX ADMIN — DOCUSATE SODIUM 100 MG: 100 CAPSULE, LIQUID FILLED ORAL at 06:04

## 2024-11-22 RX ADMIN — ACETAMINOPHEN 1000 MG: 500 TABLET ORAL at 06:04

## 2024-11-22 RX ADMIN — IBUPROFEN 800 MG: 800 TABLET, FILM COATED ORAL at 04:05

## 2024-11-22 RX ADMIN — ACETAMINOPHEN 1000 MG: 500 TABLET ORAL at 12:25

## 2024-11-22 SDOH — ECONOMIC STABILITY: TRANSPORTATION INSECURITY
IN THE PAST 12 MONTHS, HAS THE LACK OF TRANSPORTATION KEPT YOU FROM MEDICAL APPOINTMENTS OR FROM GETTING MEDICATIONS?: NO

## 2024-11-22 SDOH — ECONOMIC STABILITY: TRANSPORTATION INSECURITY
IN THE PAST 12 MONTHS, HAS LACK OF RELIABLE TRANSPORTATION KEPT YOU FROM MEDICAL APPOINTMENTS, MEETINGS, WORK OR FROM GETTING THINGS NEEDED FOR DAILY LIVING?: NO

## 2024-11-22 ASSESSMENT — PAIN DESCRIPTION - PAIN TYPE
TYPE: SURGICAL PAIN
TYPE: ACUTE PAIN
TYPE: SURGICAL PAIN
TYPE: ACUTE PAIN

## 2024-11-22 ASSESSMENT — SOCIAL DETERMINANTS OF HEALTH (SDOH)
WITHIN THE PAST 12 MONTHS, YOU WORRIED THAT YOUR FOOD WOULD RUN OUT BEFORE YOU GOT THE MONEY TO BUY MORE: NEVER TRUE
WITHIN THE LAST YEAR, HAVE YOU BEEN AFRAID OF YOUR PARTNER OR EX-PARTNER?: NO
WITHIN THE LAST YEAR, HAVE YOU BEEN HUMILIATED OR EMOTIONALLY ABUSED IN OTHER WAYS BY YOUR PARTNER OR EX-PARTNER?: NO
IN THE PAST 12 MONTHS, HAS THE ELECTRIC, GAS, OIL, OR WATER COMPANY THREATENED TO SHUT OFF SERVICE IN YOUR HOME?: NO
WITHIN THE LAST YEAR, HAVE YOU BEEN KICKED, HIT, SLAPPED, OR OTHERWISE PHYSICALLY HURT BY YOUR PARTNER OR EX-PARTNER?: NO
WITHIN THE PAST 12 MONTHS, THE FOOD YOU BOUGHT JUST DIDN'T LAST AND YOU DIDN'T HAVE MONEY TO GET MORE: NEVER TRUE
WITHIN THE LAST YEAR, HAVE TO BEEN RAPED OR FORCED TO HAVE ANY KIND OF SEXUAL ACTIVITY BY YOUR PARTNER OR EX-PARTNER?: NO

## 2024-11-22 NOTE — PROGRESS NOTES
0700: Report received from Haydee VAZQUEZ, assumed care of patient.    0820: Patient able ambulate to restroom with standby assist from Ying HUGHES. Patient able to void 100 ml.    0830: Assessment completed. Patient reports 4 pain at this time, warm pack given per patient request. IV site patent and infusing NS at 125 ml/hr. Plan of care discussed, patient verbalized understanding.    0930: Patient able to ambulate to restroom with standby assist from this RN. Patient tolerated ambulation well. Patient able to void 100 ml.

## 2024-11-22 NOTE — PROGRESS NOTES
The patient is today postoperative day #1 status post laparoscopic supracervical hysterectomy with mini laparotomy and bilateral salpingectomy.  The patient tells me this morning that other than for some soreness she feels fine and has no other problems or complaints.  She tells me this morning that her Lam catheter was just removed.  She is tolerating a regular diet.  Vital signs: The patient's vital signs are stable and she is afebrile.  The patient's temperature this morning was 37.1 °C (98.8 °F) and her heart rate this morning is 65 bpm and her respiratory rate this morning is 18 breaths/min and her blood pressure this morning was 91/57.  Her blood pressure earlier this morning was 101/58.  Her pulse oximetry this morning is 96% on room air.  General: The patient appears well-developed and well-nourished and relaxed and alert and comfortable and in no apparent distress.  Labs: The patient's white blood count this morning was normal at 9.3 and her hemoglobin and hematocrit this morning were 12.6 g/dL and 36.9% respectively and her platelet count this morning was 255,000.  Her hemoglobin and decreased from 13.7 g/dL preoperatively (on October 19, 2024) to 12.6 g/dL post operatively (this morning).  Assessment:  Postoperative day #1 status post laparoscopic supracervical hysterectomy with mini laparotomy and bilateral salpingectomy and the patient appears to be recovering nicely.  Plan:  I explained to the patient that when she is able to urinate and when she is ambulating and because she is tolerating a regular diet that we could then send her home later today and she acknowledged this.  She says she does have an appointment with me in the office in about 2 weeks.  We will plan on sending her home later today and I explained to her that I will send in for her prescriptions for Percocet and ibuprofen and she asked me if I can also write prescriptions for Mylicon and stool softeners and I told her I would do so.   I asked her to call or contact me at any time should she ever have any problems or questions or complaints and she said that she would do so.  Arsen Yanez MD

## 2024-11-22 NOTE — OP REPORT
DATE OF SERVICE:  2024     PREOPERATIVE DIAGNOSES:  1.  Menorrhagia.  2.  Dysmenorrhea.  3.  Dyspareunia.  4.  Pelvic pain.  5.  Obesity.  6.  The patient does vape.  7.  Transvaginal transabdominal pelvic ultrasound revealed evidence of uterine   anomaly, namely arcuate uterus.     POSTOPERATIVE DIAGNOSES:  1.  Menorrhagia.  2.  Dysmenorrhea.  3.  Dyspareunia.  4.  Pelvic pain.  5.  Obesity.  6.  The patient does vape.  7.  Transvaginal transabdominal pelvic ultrasound revealed evidence of uterine   anomaly, namely arcuate uterus.     PROCEDURES:  Laparoscopic supracervical hysterectomy with mini laparotomy and   bilateral salpingectomy and lysis of adhesions.     SURGEON:  Arsen Yanez MD     ASSISTANT:  Magalie Daniel MD     ANESTHESIA:  General endotracheal tube anesthesia.     ANESTHESIOLOGIST:  Nicole Lewis MD     FINDINGS:  Bimanual exam reveals no evidence of uterine enlargement and no   adnexal masses either on the right and left and the uterus was freely movable.    Speculum exam under anesthesia reveals no vulvar or vaginal or cervical   lesions and reveals that the vulva and vaginal mucosa are well estrogenized.    During laparoscopy, excellent views of the pelvis were obtained.  Adhesions in   the anterior aspect of the uterus are encountered consistent with history of   previous  sections.  Also, bilateral Filshie clips are seen.  Of note,   neither of these Filshie clips was located on the fallopian tube.  Both   ovaries are normal.  Serosal surface of the uterus appeared normal.  The   bladder was fairly adherent anteriorly.  There were some adhesions in the   right lower quadrant ostensibly from her previous appendectomy.     SPECIMENS:  Uterine corpus along with bilateral fallopian tubes and Filshie   clips.     COMPLICATIONS:  None.     ESTIMATED BLOOD LOSS:  Approximately 100 mL.     DESCRIPTION OF PROCEDURE:  After appropriate consents have been obtained, the   patient  was taken to the operating room and given general anesthesia.  After   she was given general anesthesia, placed in the dorsal lithotomy position   using Yellofin stirrups, a bimanual exam was performed and reveals no evidence   of uterine enlargement and reveals no adnexal masses either on the right or   left.  The patient was prepped and draped in the dorsal lithotomy position.  I   placed a Lam catheter and Lam catheter was found to be draining urine.    Speculum exam was performed and reveals no vulvar or vaginal or cervical   lesions.  The cervix was well visualized.  The anterior aspect of the cervix   was grasped with a single tooth tenaculum.  The cervix was dilated with Hanks   dilators.  The uterus, however, can be sounded to no more than 5 cm and so   because the uterus can only be sounded to 5 cm, thus suggesting the   possibility of a stenotic internal cervical os.  A ROMAINE uterine manipulator   was not placed and instead an acorn manipulator was advanced through the   external cervical os into the endocervical canal and attached to the single   tooth tenaculum.  A gauze sponge was placed posterior to the uterine   manipulator in the vaginal vault and left in place as the speculum was   removed.  The 's gloves were changed.  Attention was then directed to   the abdomen where a small (approximately 1.5 cm) horizontal infraumbilical   incision was made with a scalpel after the overlying skin and subcutaneous   tissue had been infiltrated with local anesthetic.  Veress needle was advanced   through this incision into the peritoneal cavity and proper placement in the   peritoneal cavity was verified with a Morris hanging drop technique.  The   peritoneal cavity was then insufflated with approximately 2-3 liters of carbon   dioxide gas.  The Veress needle was removed.  An 11 mm port was introduced   through the infraumbilical incision into the peritoneal cavity utilizing the   VersaStep trocar  system.  The central portion of this port was removed and a   10 mm 0-degree laparoscope was inserted through the remaining sleeve and   proper entry and the peritoneal cavity was verified visually with laparoscope.    The patient was placed in Trendelenburg position.  The uterus was mobilized   and findings are as noted above.  An 11 mm port was placed in left lower   quadrant under direct laparoscopic visualization after dividing skin and   subcutaneous tissue infiltrated with local anesthetic utilizing the VersaStep   trocar technique.  An 11 mm port was placed suprapubically under direct   laparoscopic visualization after overlying skin and subcutaneous tissue had   been infiltrated with local anesthetic and utilizing the VersaStep trocar   technique and this incision was made in the middle of her Pfannenstiel scar.    The Harmonic scalpel was used to lyse adhesions in the right lower quadrant.    These adhesions are ostensibly a result of previous appendectomy.  After lysis   of these adhesions in the right lower quadrant, a 11 mm port was placed in   the right lower quadrant under direct laparoscopic visualization after   overlying skin and subcutaneous tissue had been infiltrated with local   anesthetic and utilizing the VersaStep trocar technique.  At this time, the 10   mm 0-degree laparoscope was exchanged for the 10 mm 30-degree laparoscope.    It should be noted that during this procedure this 10 mm 0-degree laparoscope   was placed through all 4 ports at different times during the procedure.  The   uterus was mobilized with the uterine manipulator and findings are noted above   and pictures were taken.  It should be noted that during this procedure, both   5 mm LigaSure bipolar cutting forceps instrument was used as well as the 5 mm   Harmonic scalpel.  After attention was directed to the right side of the   uterus, the proximal right fallopian tube was thoroughly cauterized and sealed   with LigaSure  instrument and cut with Harmonic scalpel.  The right proper   ovarian ligament was thoroughly cauterized and sealed with LigaSure and cut   with Harmonic scalpel.  The proximal right fallopian tube was thoroughly   cauterized and sealed with LigaSure instrument and cut with Harmonic scalpel.    The tissue in between the incision in the right proximal round ligament and   the right proper ovarian ligament was thoroughly cauterized and sealed with   LigaSure instrument and cut with Harmonic scalpel.  The anterior leaf of the   right broad ligament serially undermined and incised with the Harmonic scalpel   in the usual fashion down to around the level of the junction between the   uterine corpus and uterine cervix.  This was performed only after adhesions in   the vicinity of the right cornual region of the uterus are thoroughly lysed,   meticulously, with the Harmonic scalpel.  During lysis of adhesions and   dissection in this area, a Filshie clip is encountered.  It should be noted   that this Filshie clip was nowhere near the right fallopian tube and it was   not attached to the right fallopian tube.  The Filshie clip was dissected away   from surrounding tissues and removed and submitted.  After the anterior leaf   of the right broad ligament was serially undermined and incised with a   Harmonic scalpel down towards the level of the junction between the uterine   corpus and uterine cervix, some adhesions in the anterior cul-de-sac and the   right ostensibly from the previous  sections are lysed with the   Harmonic scalpel.  The posterior leaf of the right broad ligament serially   thoroughly cauterized and sealed with the LigaSure instrument and cut with   Harmonic scalpel down to the level of the junction between the uterine corpus   and uterine cervix.  Ascending branches of uterine vessels on the right were   dissected out and exposed and once exposed, thoroughly cauterized and sealed   with LigaSure  and cut with Harmonic scalpel.  Attention was then directed to   the left side of the uterus.  The left proper ovarian ligament was thoroughly   cauterized and sealed with LigaSure instrument and cut with Harmonic scalpel.    The left proximal fallopian tube was thoroughly cauterized and sealed with   LigaSure instrument and cut with Harmonic scalpel.  The left proximal round   ligament was thoroughly cauterized and sealed with LigaSure instrument and cut   with Harmonic scalpel.  The tissue between the incision with left proximal   round ligament and left proper ovarian ligament was thoroughly cauterized and   sealed with LigaSure instrument and cut with Harmonic scalpel.  Anterior leaf   of left broad ligament was serially undermined and incised with the Harmonic   scalpel in the usual fashion down towards the level of the junction between   the uterine corpus and uterine cervix.  The posterior leaf of left broad   ligament was serially thoroughly cauterized, sealed, and cut with LigaSure   instrument down to around the level of the junction between the uterine corpus   and uterine cervix.  Ascending branches of uterine vessels on the left were   dissected out and exposed and once exposed, thoroughly cauterized and sealed   with LigaSure and cut with Harmonic scalpel.  The peritoneum overlying the   anterior lower uterine segment was serially undermined and incised with the   Harmonic scalpel.  The bladder was dissected away anteriorly was meticulous   and careful use of the Harmonic scalpel and blunt dissection.  Of note, the   bladder was noted to be very adherent anteriorly, but with a very meticulous   and careful dissection, the bladder was eventually dissected away anteriorly   thoroughly.  Ascending branches of uterine vessels were further bilaterally   dissected out and exposed and cauterized and sealed with LigaSure instrument   and cut with Harmonic scalpel.  At this time, the uterine corpus was noted to    be cyanotic indicating ischemia of the uterus.  A Vicryl suture was placed in   the uterine fundus using intracorporeal suturing technique and extracorporeal   knot tying technique (0 Vicryl on CT1 needle).  After the needles cut off, the   ends of the suture brought out through the right lower quadrant port and   clamped.  The uterine corpus was then amputated from the uterine cervix at   times with the use of monopolar cautery instrument, namely the J-hook and   other times with the Harmonic scalpel.  Then, at this point, the acorn uterine   manipulator was removed and so as the single tooth tenaculum cervix.  The   cervix was closed with placement of 3 interrupted simple suture of 0 Vicryl   and CT1 needles using intracorporeal suturing technique and extracorporeal   knot tying technique.  The pelvis was copiously irrigated and drained.  Fibrin   thrombin powder was placed along the cervical stump and adjacent areas to   maintain continued hemostasis.  At this time, both fallopian tubes were   removed (bilateral salpingectomies were removed) by serially thoroughly   cauterizing, sealing, and cutting the both mesosalpinx from proximal to distal   with a LigaSure instrument and the Harmonic scalpel and fallopian tubes were   submitted as specimen and excellent hemostasis was noted bilaterally.  After   both fallopian tubes are removed, laparoscope was removed and air was allowed   to evacuate the peritoneal cavity and the skin incision made to place the   suprapubic port was extended bilaterally along the patient's Pfannenstiel scar   to create an approximately the 8 cm horizontal suprapubic mini laparotomy   incision.  Again, this was created with a scalpel.  This incision continued   deeply through subcutaneous tissues using Bovie electrocautery and hemostasis   maintained with Bovie electrocautery.  The entry in the anterior rectus fascia   created by the suprapubic port was identified and extended bilaterally  with   Bovie electrocautery.  The suprapubic port was removed.  The superior aspect   of the fascial incision underneath the mini laparotomy incision was doubly   grasped with Kocher clamps and undermined from the underlying rectus muscle   with both blunt dissection and Bovie electrocautery and the Harmonic scalpel.    The inferior aspect of the fascial incision was similarly doubly grasped with   Kocher clamps and undermined from the underlying rectus muscle with both   blunt dissection and Harmonic scalpel.  The entry created in the midline of   the rectus muscle by the suprapubic port was identified and extended   superiorly and inferiorly with blunt dissection.  The entry in the parietal   peritoneum created with a suprapubic port was extended with blunt dissection.    Tension/traction was placed on the Vicryl sutures on the fundus of the   amputated uterine corpus (sutures had previously been brought out through the   suprapubic port).  This tension/traction allowed the surface of the fundus of   the amputated uterine corpus was brought to the mini laparotomy incision and   the fundus was grasped with towel clamps and the uterine corpus was removed   and submitted as a specimen.  The parietal peritoneum underneath the mini   laparotomy incision was identified and reapproximated with simple continuous   suture using 3-0 Vicryl.  The rectus muscle underneath the mini laparotomy   incision was reapproximated with placement of 2 interrupted mattress sutures   of 2-0 chromic.  Excellent hemostasis observed.  The fascia underneath the   mini laparotomy incision was reapproximated with simple continuous suture   using 0 Vicryl.  The subcutaneous space underneath the mini laparotomy   incision was copiously irrigated and drained.  Hemostasis noted to be   excellent.  Subcutaneous tissues underneath the mini laparotomy incision were   reapproximated with simple continuous suture using 3-0 Vicryl.  The peritoneal    cavity was reinsufflated and laparoscope was replaced through the   infraumbilical port and laparoscopy was resumed and hemostasis noted to be   excellent.  The fibrin thrombin powder was seen covering the cervix and   adjacent areas.  This fibrin thrombin powder had undergone changes to be   expected.  The laparoscope was removed and air was allowed to evacuate the   peritoneal cavity and all 3 ports were removed.  The fascia underneath the   infraumbilical incision identified with use of S retractors, reapproximated   with simple suture using 0 Vicryl on a UR-6 needle.  Some bleeding seen coming   from beneath the skin incision.  The right lower quadrant port was controlled   with placement of interrupted simple suture of 0 Vicryl.  The mini laparotomy   skin incision was reapproximated with placement of several interrupted buried   sutures of 4-0 Monocryl placed in the dermis and at least one set sutures   placed for every 1 cm of length along the entire length of the skin incision.    Skin incision was thus reapproximated nicely.  The remaining small   laparoscopic skin incisions (infraumbilical, left lower quadrant and right   lower quadrant) were all closed with interrupted buried sutures of 4-0   Monocryl placed in the dermis.  The vaginal gauze sponge had been removed.    The procedure was terminated.  Final lap and needle counts reported to be   correct x2 at the end of the procedure.  The patient tolerated the procedure   well and sent to postanesthesia recovery in stable condition.        ______________________________  MD DAYTON Rizo/AZM    DD:  11/21/2024 15:10  DT:  11/21/2024 17:17    Job#:  [id number]    CC:Magalie Daniel MD

## 2024-11-22 NOTE — PROGRESS NOTES
1340: Discharge education provided to patient, including follow up information. All questions answered at this time, patient verbalizes understanding. Paperwork signed and dated at this time.      1415: Patient discharged from unit, escorted by staff.

## 2024-11-22 NOTE — CARE PLAN
The patient is Stable - Low risk of patient condition declining or worsening    Shift Goals  Clinical Goals: VSS, pain control  Patient Goals: pain control  Family Goals: support    Progress made toward(s) clinical / shift goals:    Problem: Pain - Standard  Goal: Alleviation of pain or a reduction in pain to the patient’s comfort goal  Outcome: Progressing     Problem: Knowledge Deficit - Standard  Goal: Patient and family/care givers will demonstrate understanding of plan of care, disease process/condition, diagnostic tests and medications  Outcome: Progressing       Patient is not progressing towards the following goals:

## 2024-11-22 NOTE — PROGRESS NOTES
1640- Report received from Mary PACU RN over the phone.     1730- Patient transferred to postpartum room 313 with hospital transport and able to slide self over into postpartum bed. Patient on 10L oxy mask (to be continued until 2100) and pulse ox in place, VSS. POC discussed with patient at this time. Pain controlled with scheduled medications and cold packs, rest and repositioning at this time. Incision sites x4 with bandaid, gauze and tegaderm. Drainage noted on lower abdominal dressing at this time and outlined. Lma catheter in place, LR running at 125 ml/hr per order. Patient states she feels intermittently nauseous and was given medication for it prior to transfer. Patient A&Ox4. SO at bedside. SCDs in place and working. All questions answered at this time.

## 2024-11-22 NOTE — CARE PLAN
The patient is Stable - Low risk of patient condition declining or worsening    Shift Goals  Clinical Goals: patient will remain clinically stable  Patient Goals:   Family Goals:     Progress made toward(s) clinical / shift goals:      Problem: Pain - Standard  Goal: Alleviation of pain or a reduction in pain to the patient’s comfort goal  Outcome: Progressing     Problem: Urinary Elimination:  Goal: Ability to reestablish a normal urinary elimination pattern will improve  Outcome: Progressing     Problem: Mobility  Goal: Risk for activity intolerance will decrease  Outcome: Progressing     Patient is able to verbalize pain and request for pain management if needed. Patient is receiving PRN & scheduled medication, as well as nonpharm interventions for pain management. Patient has been able to void twice thus far in shift post rouse catheter removal. Patient has been able to ambulate in room.    Patient is not progressing towards the following goals:

## 2025-01-21 ENCOUNTER — HOSPITAL ENCOUNTER (EMERGENCY)
Facility: MEDICAL CENTER | Age: 40
End: 2025-01-21
Attending: EMERGENCY MEDICINE
Payer: MEDICAID

## 2025-01-21 ENCOUNTER — APPOINTMENT (OUTPATIENT)
Dept: RADIOLOGY | Facility: MEDICAL CENTER | Age: 40
End: 2025-01-21
Payer: MEDICAID

## 2025-01-21 VITALS
DIASTOLIC BLOOD PRESSURE: 66 MMHG | BODY MASS INDEX: 39.05 KG/M2 | OXYGEN SATURATION: 99 % | TEMPERATURE: 97 F | HEIGHT: 69 IN | RESPIRATION RATE: 15 BRPM | SYSTOLIC BLOOD PRESSURE: 122 MMHG | HEART RATE: 60 BPM | WEIGHT: 263.67 LBS

## 2025-01-21 DIAGNOSIS — S92.515A CLOSED NONDISPLACED FRACTURE OF PROXIMAL PHALANX OF LESSER TOE OF LEFT FOOT, INITIAL ENCOUNTER: ICD-10-CM

## 2025-01-21 PROCEDURE — 73630 X-RAY EXAM OF FOOT: CPT | Mod: LT

## 2025-01-21 PROCEDURE — 99283 EMERGENCY DEPT VISIT LOW MDM: CPT

## 2025-01-21 ASSESSMENT — FIBROSIS 4 INDEX: FIB4 SCORE: 1.23

## 2025-01-21 NOTE — DISCHARGE INSTRUCTIONS
You were seen in the emergency department after suffering injury to your toe.  The x-ray shows a nondisplaced fracture.  You can buddy tape this to the toe next to it.  This should heal without requiring surgery.  Ice and elevation can help reduce pain and swelling.    For pain you can take acetaminophen (Tylenol), 1000mg every 8 hours as needed for pain. Do not take more than 3000mg of acetaminophen in any 24 hour period. You can also take  ibuprofen (Motrin), 600mg every 6 hours as needed for pain (take with food to avoid GI upset).  Taking these medications regularly during the day can be very effective in controlling pain.    Return if you develop:  Cold dusky toe, uncontrollable pain, deformity of the toe, any other new or concerning findings

## 2025-01-21 NOTE — ED TRIAGE NOTES
Jackie Jose  39 y.o. female    Chief Complaint   Patient presents with    Toe Pain     Pt states a couple days ago she was on her bed and her husbands knees hit her left pinky toe, heard a crack. States has been painful since but getting worse.      Pt ambulated to triage with steady gait for above complaint. Pt A&OX4, VSS.    Vitals:    01/21/25 0615   BP: 127/67   Pulse: 64   Resp: 16   Temp: 36.4 °C (97.5 °F)   SpO2: 99%       Triage process explained to patient, apologized for wait time, and returned to lobby.  Pt informed to notify staff of any change in condition.

## 2025-01-21 NOTE — Clinical Note
Jackie Jose was seen and treated in our emergency department on 1/21/2025.  She may return to work on 01/23/2025.       If you have any questions or concerns, please don't hesitate to call.      Luis A Phelps M.D.

## 2025-01-21 NOTE — ED NOTES
Bedside report received from off going RN/tech: GEORGE Byrd assumed care of patient.  POC discussed with patient. Call light within reach. ERP re-eval complete.     Fall risk interventions in place: Not Applicable (all applicable per [facility] Fall risk assessment)   Continuous monitoring: Not Applicable   IVF/IV medications: Not Applicable   Oxygen: Room Air  Bedside sitter: Not Applicable   Isolation: Not Applicable

## 2025-01-21 NOTE — ED NOTES
Rishi flanagan reviewed w/ pt. Pt given xtra tape. Pt states understanding of dc instructions and f/u care. Pt able to amb out w/ steady gait.

## 2025-01-21 NOTE — ED PROVIDER NOTES
"ED Provider Note    Scribed for Dr. Phelps by Bryan Anderson. 2025,  6:52 AM.      CHIEF COMPLAINT  Chief Complaint   Patient presents with    Toe Pain     Pt states a couple days ago she was on her bed and her husbands knees hit her left pinky toe, heard a crack. States has been painful since but getting worse.        EXTERNAL RECORDS REVIEWED  Inpatient Notes hysterectomy 2024    \[facility] [place] [facility]\""      Jackie Jose is a 39 y.o. female who presents to the Emergency Department for left pinky toe pain. Patient reports that a few days ago her 's knee struck her toe while in bed, and caused an audible \"crack.\" Pain has been progressively worsening since then.  Patient  notes she exacerbated the injury by dropping a crate on her foot at work last night. Patient denies any other illness or injury.    REVIEW OF SYSTEMS  See HPI for further details. All other systems are negative.     PAST MEDICAL HISTORY     Past Medical History:   Diagnosis Date    Appendicitis 2012    Appy     Dental disorder     upper and lower densures    H/O:   &     Previous  delivery, antepartum condition or complication 2012    Vomiting 09/15/2013       SURGICAL HISTORY  Past Surgical History:   Procedure Laterality Date    SUPRACERVICAL HYSTERECTOMY SCOPE N/A 2024    Procedure: LAPAROSCOPIC SUPRACERVICAL HYSTERECTOMY WITH MINI LAPAROTOMY, BILATERAL SALPINGECTOMY, LYSIS OF ADHESIONS;  Surgeon: Arsen Yanez M.D.;  Location: SURGERY SAME DAY [facility];  Service: Gynecology    MINI LAPAROTOMY N/A 2024    Procedure: MINILAPAROTOMY;  Surgeon: Arsen Yanez M.D.;  Location: SURGERY SAME DAY [facility];  Service: Gynecology    ESTRELLA BY LAPAROSCOPY  2013    Performed by Dm Whalen M.D. at SURGERY [facility]    REPEAT C SECTION W TUBAL LIGATION  2013    Performed by Tobi Harris M.D. at LABOR AND DELIVERY    PELVISCOPY  2012    Performed by Woodrow Lopez M.D. " "at SURGERY [facility]    APPENDECTOMY  2012    OTHER ORTHOPEDIC SURGERY      meniscus repair    GYN SURGERY          PRIMARY C SECTION         FAMILY HISTORY  Family History   Problem Relation Age of Onset    Hypertension Father     Bipolar disorder Sister         Middle sibling    Thyroid Maternal Grandmother     Alcohol/Drug Maternal Grandfather        SOCIAL HISTORY    reports that she quit smoking about 11 years ago. Her smoking use included cigarettes. She started smoking about 17 years ago. She has a 6 pack-year smoking history. She has never used smokeless tobacco. She reports that she does not currently use alcohol. She reports that she does not use drugs.    CURRENT MEDICATIONS  Home Medications       Reviewed by Falguni Rodirguez R.N. (Registered Nurse) on 25 at 0620  Med List Status: Not Addressed     Medication Last Dose Status   ibuprofen (MOTRIN) 800 MG Tab  Active   sennosides-docusate sodium (SENOKOT-S) 8.6-50 MG tablet  Active   simethicone (MYLICON) 40 MG/0.6ML Suspension  Active                    ALLERGIES  Allergies   Allergen Reactions    Other Drug Anaphylaxis     IV contrast for CT, pt had respiratory distress and nausea     Morphine      Chest pain       PHYSICAL EXAM  VITAL SIGNS: /67   Pulse 64   Temp 36.4 °C (97.5 °F) (Temporal)   Resp 16   Ht 1.753 m (5' 9\")   Wt 120 kg (263 lb 10.7 oz)   SpO2 99%   BMI 38.94 kg/m²   Gen: Alert, no acute distress  HEENT: ATNC  Eyes: PERRL, EOMI, normal conjunctiva  Neck: trachea midline  Resp: no respiratory distress  CV: No JVD, regular rate and rhythm  Abd: non-distended  Ext: No deformities.  Ecchymosis and tenderness with swelling to left fifth toe, distal CSM intact  Neuro: speech fluent    DIAGNOSTIC STUDIES / PROCEDURES      RADIOLOGY  I have independently interpreted the diagnostic imaging associated with this visit.  My preliminary interpretation is as follows: X-ray foot: Proximal fifth toe " nondisplaced fracture  Radiologist interpretation:    DX-FOOT-COMPLETE 3+ LEFT   Final Result         1.  Fifth toe proximal phalanx waist fracture          COURSE & MEDICAL DECISION MAKING  Pertinent Labs & Imaging studies were reviewed. (See chart for details)    6:52 AM Patient seen and examined at bedside. I discussed the patient's diagnostic study results which show non displaced fracture. Informed patient of routes of home care. I discussed plan for discharge and follow up as outlined below. The patient is stable for discharge at this time and will return for any new or worsening symptoms. Patient given chance to ask questions. Patient verbalizes understanding and support with my plan for discharge.      INITIAL ASSESSMENT AND PLAN  Medical Decision Making: Patient presents with toe pain after minor trauma.  Significant ecchymosis.  X-ray demonstrates nondisplaced fracture.  Unlikely to require surgical intervention.  She was counseled on buddy taping, unlikely to require further follow-up, should heal without difficulty.  No evidence of other trauma, no evidence of ischemia.  No evidence of infection.    ADDITIONAL PROBLEM LIST AND DISPOSITION        Decision tools and prescription drugs considered including, but not limited to: Pain Medications recommend nonopioid over-the-counter pain medications .    The patient will return for new or worsening symptoms and is stable at the time of discharge.    DISPOSITION:  Patient will be discharged home in stable condition.    FOLLOW UP:  JAIRO BeattyP.RMerylN.  [address] [address] [phone]      As needed    [facility], Emergency Dept  [address] [address] [phone]    If symptoms worsen          FINAL IMPRESSION  1. Closed nondisplaced fracture of proximal phalanx of lesser toe of left foot, initial encounter             Bryan KOHLER (Scribe), am scribing for, and in the presence of, Luis A Phelps,  M.D..    Electronically signed by: Bryan Anderson (Scribe), 1/21/2025    I, Luis A Phelps M.D. personally performed the services described in this documentation, as scribed by Bryan Anderson in my presence, and it is both accurate and complete.    The note accurately reflects work and decisions made by me.  Luis A Phelps M.D.  1/22/2025  8:38 AM      This dictation was created using voice recognition software. The accuracy of the dictation is limited to the abilities of the software. I expect there may be some errors of grammar and possibly content. The nursing notes were reviewed and certain aspects of this information were incorporated into this note.

## 2025-03-06 ENCOUNTER — APPOINTMENT (OUTPATIENT)
Dept: RADIOLOGY | Facility: MEDICAL CENTER | Age: 40
End: 2025-03-06
Attending: EMERGENCY MEDICINE
Payer: MEDICAID

## 2025-03-06 ENCOUNTER — HOSPITAL ENCOUNTER (EMERGENCY)
Facility: MEDICAL CENTER | Age: 40
End: 2025-03-06
Attending: EMERGENCY MEDICINE
Payer: MEDICAID

## 2025-03-06 VITALS
TEMPERATURE: 97.5 F | HEIGHT: 69 IN | BODY MASS INDEX: 38.2 KG/M2 | HEART RATE: 69 BPM | RESPIRATION RATE: 16 BRPM | OXYGEN SATURATION: 96 % | WEIGHT: 257.94 LBS | DIASTOLIC BLOOD PRESSURE: 73 MMHG | SYSTOLIC BLOOD PRESSURE: 121 MMHG

## 2025-03-06 DIAGNOSIS — Z53.29 LEFT AGAINST MEDICAL ADVICE: ICD-10-CM

## 2025-03-06 DIAGNOSIS — R51.9 ACUTE NONINTRACTABLE HEADACHE, UNSPECIFIED HEADACHE TYPE: ICD-10-CM

## 2025-03-06 PROCEDURE — 700111 HCHG RX REV CODE 636 W/ 250 OVERRIDE (IP): Mod: UD | Performed by: EMERGENCY MEDICINE

## 2025-03-06 PROCEDURE — 96374 THER/PROPH/DIAG INJ IV PUSH: CPT

## 2025-03-06 PROCEDURE — 96375 TX/PRO/DX INJ NEW DRUG ADDON: CPT

## 2025-03-06 PROCEDURE — 99284 EMERGENCY DEPT VISIT MOD MDM: CPT

## 2025-03-06 RX ORDER — DIPHENHYDRAMINE HYDROCHLORIDE 50 MG/ML
25 INJECTION, SOLUTION INTRAMUSCULAR; INTRAVENOUS ONCE
Status: COMPLETED | OUTPATIENT
Start: 2025-03-06 | End: 2025-03-06

## 2025-03-06 RX ORDER — KETOROLAC TROMETHAMINE 15 MG/ML
15 INJECTION, SOLUTION INTRAMUSCULAR; INTRAVENOUS ONCE
Status: COMPLETED | OUTPATIENT
Start: 2025-03-06 | End: 2025-03-06

## 2025-03-06 RX ORDER — METOCLOPRAMIDE HYDROCHLORIDE 5 MG/ML
10 INJECTION INTRAMUSCULAR; INTRAVENOUS ONCE
Status: COMPLETED | OUTPATIENT
Start: 2025-03-06 | End: 2025-03-06

## 2025-03-06 RX ADMIN — DIPHENHYDRAMINE HYDROCHLORIDE 25 MG: 50 INJECTION, SOLUTION INTRAMUSCULAR; INTRAVENOUS at 06:56

## 2025-03-06 RX ADMIN — KETOROLAC TROMETHAMINE 15 MG: 15 INJECTION, SOLUTION INTRAMUSCULAR; INTRAVENOUS at 06:57

## 2025-03-06 RX ADMIN — METOCLOPRAMIDE HYDROCHLORIDE 10 MG: 5 INJECTION INTRAMUSCULAR; INTRAVENOUS at 06:57

## 2025-03-06 ASSESSMENT — PAIN DESCRIPTION - PAIN TYPE: TYPE: ACUTE PAIN

## 2025-03-06 ASSESSMENT — FIBROSIS 4 INDEX: FIB4 SCORE: 1.23

## 2025-03-06 NOTE — ED NOTES
Assist RN: PIV removed for dc. Pt requesting to leave AMA. Informed it would be best to stay until all imaging is completed and reviewed by ERP, pt still decided to leave AMA. ERP aware. Copy of AMA form provided to pt upon dc. Pt instructed to return to emergency department for new or worsening symptoms. Pt verbalized understanding. Pt ambulatory upon discharge with all belongings.

## 2025-03-06 NOTE — ED TRIAGE NOTES
"Chief Complaint   Patient presents with    Headache     x4 days, worsening this am. Pt endorses some sensitivity to light on L side. Pt reports L pupil is bigger than right. -blurred vision       Pt is alert and oriented, speaking in full sentences, follows commands and responds appropriately to questions. Resperations are even and unlabored.      Pt placed in lobby. Pt educated on triage process. Pt encouraged to alert staff for any changes.     Patient and staff wearing appropriate PPE.    BP (!) 142/74   Pulse 82   Temp 36.4 °C (97.5 °F) (Temporal)   Resp 16   Ht 1.753 m (5' 9\")   Wt 117 kg (257 lb 15 oz)   SpO2 97%      "

## 2025-03-06 NOTE — ED NOTES
Report received. Bedside rounding completed. Pt ambulatory to BR self with steady gait. Aware of POC.

## 2025-03-06 NOTE — ED NOTES
Bedside report given to the next shift GEORGE Guillen for continuity of care and management.  Provided opportunity to asks questions.  Pt connected to monitor  All pt belongings at bedside    Contraptions: IV gauge 20 Lt FA  Alert and Oriented: x 4  Ambulatory: Yes  Oxygen Treatment: None   Pending: CT

## 2025-03-06 NOTE — ED PROVIDER NOTES
ER Provider Note    Scribed for Manohar Wade M.D. by Breezy Feliciaon. 3/6/2025   6:29 AM    Primary Care Provider: NICOLAS Beatty    CHIEF COMPLAINT  Chief Complaint   Patient presents with    Headache     x4 days, worsening this am. Pt endorses some sensitivity to light on L side. Pt reports L pupil is bigger than right. -blurred vision     EXTERNAL RECORDS REVIEWED  Inpatient Notes Patient had hysterectomy performed  with Dr. Yanez.     HPI/ROS  LIMITATION TO HISTORY   Select: : None  OUTSIDE HISTORIAN(S):  None.     Jackie Jose is a 39 y.o. female who presents to the ED for evaluation of a headache onset four days ago. Patient reports that she has had an intermittent headache for a while now, but her current headache has been present for the past four days. She has been attempting to treat her headache with Tylenol, Motrin, and Excedrin. This morning, she woke up with worsening pain, and noticed that her left pupil was larger than her right. She locates the pain to the left side of her head. Patient reports that sleeping relieves her pain, and she has not noticed any specific exacerbating factors. She denies any blurred vision. Patient had a hysterectomy recently performed.    PAST MEDICAL HISTORY  Past Medical History:   Diagnosis Date    Appendicitis 2012    Appy     Dental disorder     upper and lower densures    H/O:   &     Previous  delivery, antepartum condition or complication 2012    Vomiting 09/15/2013       SURGICAL HISTORY  Past Surgical History:   Procedure Laterality Date    SUPRACERVICAL HYSTERECTOMY SCOPE N/A 2024    Procedure: LAPAROSCOPIC SUPRACERVICAL HYSTERECTOMY WITH MINI LAPAROTOMY, BILATERAL SALPINGECTOMY, LYSIS OF ADHESIONS;  Surgeon: Arsen Yanez M.D.;  Location: SURGERY SAME DAY [facility];  Service: Gynecology    MINI LAPAROTOMY N/A 2024    Procedure: MINILAPAROTOMY;  Surgeon: Arsen Yanez M.D.;   "Location: SURGERY SAME DAY [facility];  Service: Gynecology    ESTRELLA BY LAPAROSCOPY  2013    Performed by Dm Whalen M.D. at SURGERY [facility]    REPEAT C SECTION W TUBAL LIGATION  2013    Performed by Tobi Harris M.D. at LABOR AND DELIVERY    PELVISCOPY  2012    Performed by Woodrow Lopez M.D. at SURGERY [facility]    APPENDECTOMY  2012    OTHER ORTHOPEDIC SURGERY      meniscus repair    GYN SURGERY          PRIMARY C SECTION         FAMILY HISTORY  Family History   Problem Relation Age of Onset    Hypertension Father     Bipolar disorder Sister         Middle sibling    Thyroid Maternal Grandmother     Alcohol/Drug Maternal Grandfather        SOCIAL HISTORY   reports that she quit smoking about 11 years ago. Her smoking use included cigarettes. She started smoking about 17 years ago. She has a 6 pack-year smoking history. She has never used smokeless tobacco. She reports that she does not currently use alcohol. She reports that she does not use drugs.    CURRENT MEDICATIONS  Discharge Medication List as of 3/6/2025  7:19 AM        CONTINUE these medications which have NOT CHANGED    Details   ibuprofen (MOTRIN) 800 MG Tab Take 1 Tablet by mouth every 8 hours as needed for Mild Pain or Moderate Pain., Disp-30 Tablet, R-0, Normal      sennosides-docusate sodium (SENOKOT-S) 8.6-50 MG tablet Take 1 Tablet by mouth every day., Disp-30 Tablet, R-11, Normal      simethicone (MYLICON) 40 MG/0.6ML Suspension Take 0.6 mL by mouth 4 times a day as needed (abdominal gas discomfort)., Disp-30 mL, R-3, Normal             ALLERGIES  Allergies   Allergen Reactions    Other Drug Anaphylaxis     IV contrast for CT, pt had respiratory distress and nausea     Morphine      Chest pain        PHYSICAL EXAM  BP (!) 142/74   Pulse 82   Temp 36.4 °C (97.5 °F) (Temporal)   Resp 16   Ht 1.753 m (5' 9\")   Wt 117 kg (257 lb 15 oz)   LMP 10/19/2024 (Exact Date)   SpO2 97%   BMI " 38.09 kg/m²    Nursing note and vitals reviewed.  Constitutional: Well-developed and well-nourished. No distress.   HENT: Head is normocephalic and atraumatic. Oropharynx is clear and moist without exudate or erythema.   Eyes: Pupils are equal, round, and reactive to light. Conjunctiva are normal.   Cardiovascular: Normal rate and regular rhythm. No murmur heard. Normal radial pulses.  Pulmonary/Chest: Breath sounds normal. No wheezes or rales.   Abdominal: Soft and non-tender. No distention    Musculoskeletal: Extremities exhibit normal range of motion without edema or tenderness.   Neurological: Awake, alert and oriented to person, place, and time. No focal deficits noted. Alert & oriented x 3, Normal cognition, Cranial nerves II-XII are intact, normal speech and language, strength to bilateral upper and lower extremities is normal, sensation is intact throughout.  Skin: Skin is warm and dry. No rash.   Psychiatric: Normal mood and affect. Appropriate for clinical situation    ASSESSMENT AND PLAN    6:30 AM - Patient was evaluated at bedside. Patient arrives today with a four day headache that worsened this morning. Ordered for CT-Head to evaluate. The patient will be medicated with Benadryl 25 mg, Reglan 10 mg, and Toradol 15 mg for her symptoms. Patient verbalizes understanding and support with my plan of care.  Differential diagnoses include but not limited to: migraine headache, tension headache.      9:34 AM - Nursing staff informed me that patient has elected to leave the ED at this time.     DISPOSITION AND DISCUSSIONS    I have discussed management of the patient with the following physicians and ABIGAIL's:  None.     Discussion of management with other Q or appropriate source(s): None     Escalation of care considered, and ultimately not performed: diagnostic imaging.    Barriers to care at this time, including but not limited to:  None known .     Decision tools and prescription drugs considered including,  but not limited to:  None .     The patient will return for new or worsening symptoms and is stable at the time of discharge.    DISPOSITION:  Patient will be discharged home in stable condition.    FOLLOW UP:  [facility], Emergency Dept  [address] [address] [phone]    If symptoms worsen      OUTPATIENT MEDICATIONS:  Discharge Medication List as of 3/6/2025  7:19 AM           FINAL DIAGNOSIS  1. Acute nonintractable headache, unspecified headache type    2. Left against medical advice         Breezy KOHLER (Scribmelissa), am scribing for, and in the presence of, Manohar Wade M.D..    Electronically signed by: Breezy Feliciano (Thao), 3/6/2025    Manohar KOHLER M.D. personally performed the services described in this documentation, as scribed by Breezy Feliciano in my presence, and it is both accurate and complete.      The note accurately reflects work and decisions made by me.  Manohar Wade M.D.  3/6/2025  1:18 PM

## 2025-05-28 ENCOUNTER — HOSPITAL ENCOUNTER (OUTPATIENT)
Facility: MEDICAL CENTER | Age: 40
End: 2025-05-28
Attending: INTERNAL MEDICINE

## (undated) DEVICE — WATER IRRIGATION STERILE 1000ML (12EA/CA)

## (undated) DEVICE — SENSOR OXIMETER ADULT SPO2 RD SET (20EA/BX)

## (undated) DEVICE — DRAPESURG STERI-DRAPE LONG - (10/BX 4BX/CA)

## (undated) DEVICE — TUBE CONNECTING SUCTION - CLEAR PLASTIC STERILE 72 IN (50EA/CA)

## (undated) DEVICE — TOWEL STOP TIMEOUT SAFETY FLAG (40EA/CA)

## (undated) DEVICE — PAD SANITARY 11IN MAXI IND WRAPPED (12EA/PK 24PK/CA)

## (undated) DEVICE — HEMOSTAT ABSORBABLE POWDER SURGICEL 3G (5EA/BX)

## (undated) DEVICE — BANDAID SHEER STRIP 3/4 IN (100EA/BX 12BX/CA)

## (undated) DEVICE — GOWN SURGEONS X-LARGE - DISP. (30/CA)

## (undated) DEVICE — GOWN WARMING STANDARD FLEX - (30/CA)

## (undated) DEVICE — SPONGE XRAY 8X4 STERL. 12PL - (10EA/TY 80TY/CA)

## (undated) DEVICE — GOWN SURGEONS LARGE - (32/CA)

## (undated) DEVICE — KIT  I.V. START (100EA/CA)

## (undated) DEVICE — SUTURE GENERAL

## (undated) DEVICE — SUTURE 4-0 MONOCRYL PLUS PS-2 - 27 INCH (36/BX)

## (undated) DEVICE — SET SUCTION/IRRIGATION WITH DISPOSABLE TIP (6/CA )PART #0250-070-520 IS A SUB

## (undated) DEVICE — BLADE SURGICAL #15 - (50/BX 3BX/CA)

## (undated) DEVICE — LACTATED RINGERS INJ 1000 ML - (14EA/CA 60CA/PF)

## (undated) DEVICE — ELECTRODE 5MM LHK LAPSCP STERILE DISP- MEGADYNE (5/CA)

## (undated) DEVICE — NEEDLE INSUFFLATION FOR STEP - (12/BX)

## (undated) DEVICE — CANNULA O2 COMFORT SOFT EAR ADULT 7 FT TUBING (50/CA)

## (undated) DEVICE — APPLICATOR ENDOSCOPIC SURGICEL (5EA/BX)

## (undated) DEVICE — SEALER VESSEL HARMONIC ACE PLUS WITH ADVANCED HEMOSTASIS 36CM (1/EA)

## (undated) DEVICE — SODIUM CHL IRRIGATION 0.9% 1000ML (12EA/CA)

## (undated) DEVICE — BOVIE BLADE COATED &INSULATED - 25/PK

## (undated) DEVICE — DRESSING TRANSPARENT FILM TEGADERM 2.375 X 2.75" (100EA/BX)"

## (undated) DEVICE — TRAY FOLEY CATHETER STATLOCK 16FR SURESTEP (10EA/CA)

## (undated) DEVICE — CANISTER SUCTION RIGID RED 1500CC (40EA/CA)

## (undated) DEVICE — SLEEVE VASO DVT COMPRESSION CALF MED - (10PR/CA)

## (undated) DEVICE — GLOVE SZ 7 BIOGEL PI MICRO - PF LF (50PR/BX 4BX/CA)

## (undated) DEVICE — SUCTION INSTRUMENT YANKAUER BULBOUS TIP W/O VENT (50EA/CA)

## (undated) DEVICE — LIGASURE LAPAROSCOPIC 5MM - (6EA/CA)

## (undated) DEVICE — Device

## (undated) DEVICE — GLOVE BIOGEL PI INDICATOR SZ 6.5 SURGICAL PF LF - (50/BX 4BX/CA)

## (undated) DEVICE — SUTURE 0 VICRYL PLUS CT-1 - 36 INCH (36/BX)

## (undated) DEVICE — MASK OXYGEN VNYL ADLT MED CONC WITH 7 FOOT TUBING - (50EA/CA)

## (undated) DEVICE — PENCIL ELECTSURG 10FT HLSTR - WITH BLADE (50EA/CA)

## (undated) DEVICE — SET LEADWIRE 5 LEAD BEDSIDE DISPOSABLE ECG (1SET OF 5/EA)

## (undated) DEVICE — SLEEVE STERILE A599T - 30/BX 2BX/CS

## (undated) DEVICE — PENCIL ELECTSURG 10FT BTN SWH - (50/CA)

## (undated) DEVICE — GLOVE BIOGEL SZ 7.5 SURGICAL PF LTX - (50PR/BX 4BX/CA)

## (undated) DEVICE — GLOVE BIOGEL SZ 6.5 SURGICAL PF LTX (50PR/BX 4BX/CA)

## (undated) DEVICE — ELECTRODE DUAL RETURN W/ CORD - (50/PK)

## (undated) DEVICE — TROCAR STEP 11MM - (3/CA)

## (undated) DEVICE — GLOVE SZ 6.5 BIOGEL PI MICRO - PF LF (50PR/BX)

## (undated) DEVICE — GLOVE BIOGEL PI INDICATOR SZ 7.0 SURGICAL PF LF - (50/BX 4BX/CA)

## (undated) DEVICE — TUBING CLEARLINK DUO-VENT - C-FLO (48EA/CA)

## (undated) DEVICE — CANISTER SUCTION 3000ML MECHANICAL FILTER AUTO SHUTOFF MEDI-VAC NONSTERILE LF DISP (40EA/CA)

## (undated) DEVICE — SUTURE 0 VICRYL PLUS UR-6 - 27 INCH (36/BX)

## (undated) DEVICE — TUBE E-T HI-LO CUFF 7.5MM (10EA/PK)